# Patient Record
Sex: MALE | Race: WHITE | NOT HISPANIC OR LATINO | URBAN - METROPOLITAN AREA
[De-identification: names, ages, dates, MRNs, and addresses within clinical notes are randomized per-mention and may not be internally consistent; named-entity substitution may affect disease eponyms.]

---

## 2019-10-24 ENCOUNTER — INPATIENT (INPATIENT)
Facility: HOSPITAL | Age: 84
LOS: 4 days | Discharge: HOME | End: 2019-10-29
Attending: INTERNAL MEDICINE | Admitting: INTERNAL MEDICINE
Payer: MEDICARE

## 2019-10-24 VITALS
SYSTOLIC BLOOD PRESSURE: 149 MMHG | OXYGEN SATURATION: 97 % | TEMPERATURE: 98 F | DIASTOLIC BLOOD PRESSURE: 77 MMHG | RESPIRATION RATE: 16 BRPM | HEART RATE: 95 BPM

## 2019-10-24 DIAGNOSIS — L03.116 CELLULITIS OF LEFT LOWER LIMB: ICD-10-CM

## 2019-10-24 DIAGNOSIS — Z98.890 OTHER SPECIFIED POSTPROCEDURAL STATES: Chronic | ICD-10-CM

## 2019-10-24 DIAGNOSIS — R55 SYNCOPE AND COLLAPSE: ICD-10-CM

## 2019-10-24 DIAGNOSIS — R33.9 RETENTION OF URINE, UNSPECIFIED: ICD-10-CM

## 2019-10-24 DIAGNOSIS — Z29.9 ENCOUNTER FOR PROPHYLACTIC MEASURES, UNSPECIFIED: ICD-10-CM

## 2019-10-24 DIAGNOSIS — Z90.49 ACQUIRED ABSENCE OF OTHER SPECIFIED PARTS OF DIGESTIVE TRACT: Chronic | ICD-10-CM

## 2019-10-24 DIAGNOSIS — E78.5 HYPERLIPIDEMIA, UNSPECIFIED: ICD-10-CM

## 2019-10-24 DIAGNOSIS — N40.1 BENIGN PROSTATIC HYPERPLASIA WITH LOWER URINARY TRACT SYMPTOMS: ICD-10-CM

## 2019-10-24 DIAGNOSIS — E86.1 HYPOVOLEMIA: ICD-10-CM

## 2019-10-24 LAB
ALBUMIN SERPL ELPH-MCNC: 4.1 G/DL — SIGNIFICANT CHANGE UP (ref 3.3–5)
ALBUMIN SERPL ELPH-MCNC: 4.4 G/DL — SIGNIFICANT CHANGE UP (ref 3.3–5)
ALP SERPL-CCNC: 54 U/L — SIGNIFICANT CHANGE UP (ref 40–120)
ALP SERPL-CCNC: 67 U/L — SIGNIFICANT CHANGE UP (ref 40–120)
ALT FLD-CCNC: 10 U/L — SIGNIFICANT CHANGE UP (ref 4–41)
ALT FLD-CCNC: SIGNIFICANT CHANGE UP U/L (ref 4–41)
ANION GAP SERPL CALC-SCNC: 12 MMO/L — SIGNIFICANT CHANGE UP (ref 7–14)
ANION GAP SERPL CALC-SCNC: 14 MMO/L — SIGNIFICANT CHANGE UP (ref 7–14)
APPEARANCE UR: CLEAR — SIGNIFICANT CHANGE UP
APTT BLD: 20.4 SEC — LOW (ref 27.5–36.3)
AST SERPL-CCNC: 23 U/L — SIGNIFICANT CHANGE UP (ref 4–40)
AST SERPL-CCNC: 61 U/L — HIGH (ref 4–40)
BACTERIA # UR AUTO: NEGATIVE — SIGNIFICANT CHANGE UP
BASE EXCESS BLDV CALC-SCNC: 3.9 MMOL/L — SIGNIFICANT CHANGE UP
BASOPHILS # BLD AUTO: 0.03 K/UL — SIGNIFICANT CHANGE UP (ref 0–0.2)
BASOPHILS NFR BLD AUTO: 0.3 % — SIGNIFICANT CHANGE UP (ref 0–2)
BILIRUB SERPL-MCNC: 0.4 MG/DL — SIGNIFICANT CHANGE UP (ref 0.2–1.2)
BILIRUB SERPL-MCNC: 0.5 MG/DL — SIGNIFICANT CHANGE UP (ref 0.2–1.2)
BILIRUB UR-MCNC: NEGATIVE — SIGNIFICANT CHANGE UP
BLOOD GAS VENOUS - CREATININE: 0.7 MG/DL — SIGNIFICANT CHANGE UP (ref 0.5–1.3)
BLOOD GAS VENOUS - FIO2: 21 — SIGNIFICANT CHANGE UP
BLOOD UR QL VISUAL: NEGATIVE — SIGNIFICANT CHANGE UP
BUN SERPL-MCNC: 14 MG/DL — SIGNIFICANT CHANGE UP (ref 7–23)
BUN SERPL-MCNC: 15 MG/DL — SIGNIFICANT CHANGE UP (ref 7–23)
CALCIUM SERPL-MCNC: 10.1 MG/DL — SIGNIFICANT CHANGE UP (ref 8.4–10.5)
CALCIUM SERPL-MCNC: 9.7 MG/DL — SIGNIFICANT CHANGE UP (ref 8.4–10.5)
CHLORIDE BLDV-SCNC: 104 MMOL/L — SIGNIFICANT CHANGE UP (ref 96–108)
CHLORIDE SERPL-SCNC: 101 MMOL/L — SIGNIFICANT CHANGE UP (ref 98–107)
CHLORIDE SERPL-SCNC: 102 MMOL/L — SIGNIFICANT CHANGE UP (ref 98–107)
CK MB BLD-MCNC: 2.5 — SIGNIFICANT CHANGE UP (ref 0–2.5)
CK MB BLD-MCNC: 7.94 NG/ML — HIGH (ref 1–6.6)
CK SERPL-CCNC: 197 U/L — SIGNIFICANT CHANGE UP (ref 30–200)
CK SERPL-CCNC: 314 U/L — HIGH (ref 30–200)
CK SERPL-CCNC: SIGNIFICANT CHANGE UP U/L (ref 30–200)
CO2 SERPL-SCNC: 24 MMOL/L — SIGNIFICANT CHANGE UP (ref 22–31)
CO2 SERPL-SCNC: 26 MMOL/L — SIGNIFICANT CHANGE UP (ref 22–31)
COLOR SPEC: SIGNIFICANT CHANGE UP
CREAT SERPL-MCNC: 0.62 MG/DL — SIGNIFICANT CHANGE UP (ref 0.5–1.3)
CREAT SERPL-MCNC: 0.66 MG/DL — SIGNIFICANT CHANGE UP (ref 0.5–1.3)
EOSINOPHIL # BLD AUTO: 0.03 K/UL — SIGNIFICANT CHANGE UP (ref 0–0.5)
EOSINOPHIL NFR BLD AUTO: 0.3 % — SIGNIFICANT CHANGE UP (ref 0–6)
GAS PNL BLDV: 137 MMOL/L — SIGNIFICANT CHANGE UP (ref 136–146)
GLUCOSE BLDV-MCNC: 165 MG/DL — HIGH (ref 70–99)
GLUCOSE SERPL-MCNC: 119 MG/DL — HIGH (ref 70–99)
GLUCOSE SERPL-MCNC: 167 MG/DL — HIGH (ref 70–99)
GLUCOSE UR-MCNC: NEGATIVE — SIGNIFICANT CHANGE UP
HBA1C BLD-MCNC: 5.8 % — HIGH (ref 4–5.6)
HCO3 BLDV-SCNC: 26 MMOL/L — SIGNIFICANT CHANGE UP (ref 20–27)
HCT VFR BLD CALC: 43.7 % — SIGNIFICANT CHANGE UP (ref 39–50)
HCT VFR BLDV CALC: 43.9 % — SIGNIFICANT CHANGE UP (ref 39–51)
HGB BLD-MCNC: 13.7 G/DL — SIGNIFICANT CHANGE UP (ref 13–17)
HGB BLDV-MCNC: 14.3 G/DL — SIGNIFICANT CHANGE UP (ref 13–17)
HYALINE CASTS # UR AUTO: NEGATIVE — SIGNIFICANT CHANGE UP
IMM GRANULOCYTES NFR BLD AUTO: 0.5 % — SIGNIFICANT CHANGE UP (ref 0–1.5)
INR BLD: 0.97 — SIGNIFICANT CHANGE UP (ref 0.88–1.17)
KETONES UR-MCNC: NEGATIVE — SIGNIFICANT CHANGE UP
LACTATE BLDV-MCNC: 2.2 MMOL/L — HIGH (ref 0.5–2)
LEUKOCYTE ESTERASE UR-ACNC: NEGATIVE — SIGNIFICANT CHANGE UP
LYMPHOCYTES # BLD AUTO: 1.05 K/UL — SIGNIFICANT CHANGE UP (ref 1–3.3)
LYMPHOCYTES # BLD AUTO: 10.8 % — LOW (ref 13–44)
MCHC RBC-ENTMCNC: 31.4 % — LOW (ref 32–36)
MCHC RBC-ENTMCNC: 31.5 PG — SIGNIFICANT CHANGE UP (ref 27–34)
MCV RBC AUTO: 100.5 FL — HIGH (ref 80–100)
MONOCYTES # BLD AUTO: 0.72 K/UL — SIGNIFICANT CHANGE UP (ref 0–0.9)
MONOCYTES NFR BLD AUTO: 7.4 % — SIGNIFICANT CHANGE UP (ref 2–14)
NEUTROPHILS # BLD AUTO: 7.82 K/UL — HIGH (ref 1.8–7.4)
NEUTROPHILS NFR BLD AUTO: 80.7 % — HIGH (ref 43–77)
NITRITE UR-MCNC: NEGATIVE — SIGNIFICANT CHANGE UP
NRBC # FLD: 0 K/UL — SIGNIFICANT CHANGE UP (ref 0–0)
PCO2 BLDV: 54 MMHG — HIGH (ref 41–51)
PH BLDV: 7.35 PH — SIGNIFICANT CHANGE UP (ref 7.32–7.43)
PH UR: 7.5 — SIGNIFICANT CHANGE UP (ref 5–8)
PLATELET # BLD AUTO: 122 K/UL — LOW (ref 150–400)
PMV BLD: 13.5 FL — HIGH (ref 7–13)
PO2 BLDV: 27 MMHG — LOW (ref 35–40)
POTASSIUM BLDV-SCNC: 3.7 MMOL/L — SIGNIFICANT CHANGE UP (ref 3.4–4.5)
POTASSIUM SERPL-MCNC: 4.2 MMOL/L — SIGNIFICANT CHANGE UP (ref 3.5–5.3)
POTASSIUM SERPL-MCNC: SIGNIFICANT CHANGE UP MMOL/L (ref 3.5–5.3)
POTASSIUM SERPL-SCNC: 4.2 MMOL/L — SIGNIFICANT CHANGE UP (ref 3.5–5.3)
POTASSIUM SERPL-SCNC: SIGNIFICANT CHANGE UP MMOL/L (ref 3.5–5.3)
PROT SERPL-MCNC: 7.8 G/DL — SIGNIFICANT CHANGE UP (ref 6–8.3)
PROT SERPL-MCNC: SIGNIFICANT CHANGE UP G/DL (ref 6–8.3)
PROT UR-MCNC: 20 — SIGNIFICANT CHANGE UP
PROTHROM AB SERPL-ACNC: 10.7 SEC — SIGNIFICANT CHANGE UP (ref 9.8–13.1)
RBC # BLD: 4.35 M/UL — SIGNIFICANT CHANGE UP (ref 4.2–5.8)
RBC # FLD: 14.3 % — SIGNIFICANT CHANGE UP (ref 10.3–14.5)
RBC CASTS # UR COMP ASSIST: SIGNIFICANT CHANGE UP (ref 0–?)
SAO2 % BLDV: 42 % — LOW (ref 60–85)
SODIUM SERPL-SCNC: 137 MMOL/L — SIGNIFICANT CHANGE UP (ref 135–145)
SODIUM SERPL-SCNC: 142 MMOL/L — SIGNIFICANT CHANGE UP (ref 135–145)
SP GR SPEC: 1.01 — SIGNIFICANT CHANGE UP (ref 1–1.04)
SQUAMOUS # UR AUTO: SIGNIFICANT CHANGE UP
TROPONIN T, HIGH SENSITIVITY: 17 NG/L — SIGNIFICANT CHANGE UP (ref ?–14)
TROPONIN T, HIGH SENSITIVITY: 30 NG/L — SIGNIFICANT CHANGE UP (ref ?–14)
TROPONIN T, HIGH SENSITIVITY: 31 NG/L — SIGNIFICANT CHANGE UP (ref ?–14)
TSH SERPL-MCNC: 0.91 UIU/ML — SIGNIFICANT CHANGE UP (ref 0.27–4.2)
UROBILINOGEN FLD QL: NORMAL — SIGNIFICANT CHANGE UP
WBC # BLD: 9.7 K/UL — SIGNIFICANT CHANGE UP (ref 3.8–10.5)
WBC # FLD AUTO: 9.7 K/UL — SIGNIFICANT CHANGE UP (ref 3.8–10.5)
WBC UR QL: SIGNIFICANT CHANGE UP (ref 0–?)

## 2019-10-24 PROCEDURE — 71045 X-RAY EXAM CHEST 1 VIEW: CPT | Mod: 26

## 2019-10-24 PROCEDURE — 72125 CT NECK SPINE W/O DYE: CPT | Mod: 26

## 2019-10-24 PROCEDURE — 70450 CT HEAD/BRAIN W/O DYE: CPT | Mod: 26

## 2019-10-24 PROCEDURE — 72170 X-RAY EXAM OF PELVIS: CPT | Mod: 26

## 2019-10-24 PROCEDURE — 99222 1ST HOSP IP/OBS MODERATE 55: CPT

## 2019-10-24 RX ORDER — INFLUENZA VIRUS VACCINE 15; 15; 15; 15 UG/.5ML; UG/.5ML; UG/.5ML; UG/.5ML
0.5 SUSPENSION INTRAMUSCULAR ONCE
Refills: 0 | Status: DISCONTINUED | OUTPATIENT
Start: 2019-10-24 | End: 2019-10-29

## 2019-10-24 RX ORDER — CEFAZOLIN SODIUM 1 G
1000 VIAL (EA) INJECTION EVERY 8 HOURS
Refills: 0 | Status: DISCONTINUED | OUTPATIENT
Start: 2019-10-24 | End: 2019-10-29

## 2019-10-24 RX ORDER — ASPIRIN/CALCIUM CARB/MAGNESIUM 324 MG
162 TABLET ORAL ONCE
Refills: 0 | Status: COMPLETED | OUTPATIENT
Start: 2019-10-24 | End: 2019-10-24

## 2019-10-24 RX ORDER — SODIUM CHLORIDE 9 MG/ML
1000 INJECTION, SOLUTION INTRAVENOUS ONCE
Refills: 0 | Status: COMPLETED | OUTPATIENT
Start: 2019-10-24 | End: 2019-10-24

## 2019-10-24 RX ORDER — SODIUM CHLORIDE 9 MG/ML
1000 INJECTION INTRAMUSCULAR; INTRAVENOUS; SUBCUTANEOUS
Refills: 0 | Status: DISCONTINUED | OUTPATIENT
Start: 2019-10-24 | End: 2019-10-29

## 2019-10-24 RX ORDER — HEPARIN SODIUM 5000 [USP'U]/ML
5000 INJECTION INTRAVENOUS; SUBCUTANEOUS EVERY 8 HOURS
Refills: 0 | Status: DISCONTINUED | OUTPATIENT
Start: 2019-10-24 | End: 2019-10-29

## 2019-10-24 RX ORDER — ASPIRIN/CALCIUM CARB/MAGNESIUM 324 MG
81 TABLET ORAL DAILY
Refills: 0 | Status: DISCONTINUED | OUTPATIENT
Start: 2019-10-25 | End: 2019-10-29

## 2019-10-24 RX ORDER — TAMSULOSIN HYDROCHLORIDE 0.4 MG/1
0.4 CAPSULE ORAL AT BEDTIME
Refills: 0 | Status: DISCONTINUED | OUTPATIENT
Start: 2019-10-24 | End: 2019-10-29

## 2019-10-24 RX ADMIN — SODIUM CHLORIDE 75 MILLILITER(S): 9 INJECTION INTRAMUSCULAR; INTRAVENOUS; SUBCUTANEOUS at 21:17

## 2019-10-24 RX ADMIN — Medication 162 MILLIGRAM(S): at 13:16

## 2019-10-24 RX ADMIN — HEPARIN SODIUM 5000 UNIT(S): 5000 INJECTION INTRAVENOUS; SUBCUTANEOUS at 21:17

## 2019-10-24 RX ADMIN — SODIUM CHLORIDE 1000 MILLILITER(S): 9 INJECTION, SOLUTION INTRAVENOUS at 10:59

## 2019-10-24 RX ADMIN — Medication 100 MILLIGRAM(S): at 17:15

## 2019-10-24 RX ADMIN — TAMSULOSIN HYDROCHLORIDE 0.4 MILLIGRAM(S): 0.4 CAPSULE ORAL at 17:16

## 2019-10-24 RX ADMIN — SODIUM CHLORIDE 75 MILLILITER(S): 9 INJECTION INTRAMUSCULAR; INTRAVENOUS; SUBCUTANEOUS at 17:15

## 2019-10-24 NOTE — H&P ADULT - NEGATIVE CARDIOVASCULAR SYMPTOMS
no chest pain/no orthopnea/no dyspnea on exertion/no peripheral edema/no claudication/no palpitations/no paroxysmal nocturnal dyspnea

## 2019-10-24 NOTE — ED ADULT TRIAGE NOTE - MEANS OF ARRIVAL
wears compression stockings, recent duplex due to peripheral edema, reports negative for blood clots, recommended compression stockings ambulatory

## 2019-10-24 NOTE — ED PROVIDER NOTE - PROGRESS NOTE DETAILS
IOANA Rosen - Lactate 2.2. Will give LR bolus. Silas, PGY1 - CMP hemolyzed, Trop 17. No current CP. Will get 1hr repeat Trop and repeat CMP. XR pelvis, CXR negative. Pending UA, CT. Silas, PGY1 - Spoke to daughter Nicol Glasgow [phone]. She states that pt has h/o MI ~25 years ago, no stents, pt is supposed to be on heart meds. Updated her on current pt results and plan. Silas, PGY1 - Trop 17 -> 31, , pt given ASA. Repeat EKG similar to prior.

## 2019-10-24 NOTE — H&P ADULT - NSICDXPASTSURGICALHX_GEN_ALL_CORE_FT
PAST SURGICAL HISTORY:  H/O eye surgery within last year    H/O hernia repair 2x; 1990, 2009    History of atherectomy age 68 y/o post MI 25 years go    S/P laparoscopic appendectomy 1947 PAST SURGICAL HISTORY:  H/O eye surgery within last year    H/O hernia repair 2x; 1990, 2009    History of atherectomy age 66 y/o post MI 25 years go    S/P laparoscopic appendectomy 1947

## 2019-10-24 NOTE — H&P ADULT - NSHPSOCIALHISTORY_GEN_ALL_CORE
Pt is Muslim, , [place] [facility] [place] [facility]. Pt has a twin sister still living, and a brother, who passed away at age 58 from CAD. 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 Pt does not drink alcohol anymore, last drink in 1975. Pt never smoked cigarettes and denies illicit drug use at this time. Pt has good appetite, but sometimes does not eat breakfast. Pt Pt is Episcopalian, , currently lives alone in [place] apartment co-op. Pt's wife passed away 9 year ago from a 2nd MI. Pt has a twin sister, still living, in [place], and a brother, who passed away at age 58 from [employer]. Pt has two children, 2 grandchildren and 2 great grandchildren. 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 Pt does not drink alcohol anymore, last drink in 1975. Pt never smoked cigarettes and denies illicit drug use at this time. Pt has good appetite, but sometimes does not eat breakfast. Pt follows a low cholesterol diet. Pt used to be in the  and served from [id number] and [id number]. Pt has not had his flu or PNA vaccination, but would do so here. Pt does not used supplemental O2 or CPAP at home.

## 2019-10-24 NOTE — CONSULT NOTE ADULT - ASSESSMENT
89 y/o  male with PMHx of cataracts, HLD, past MI 24 y/o s/p atherectomy, CAD, varicose veins, h/o hernia x2, h/o fractured ankle 3 mo ago, and h/o BPH, p/w  syncopal episode, admitted to tele for syncope, r/o ACS, hypovolemia dehydration and acute cellulitis LLE. 89 y/o  male with PMHx of cataracts, HLD, past MI 24 y/o s/p atherectomy, CAD, varicose veins, h/o hernia x2, h/o fractured ankle 3 mo ago, and h/o BPH, p/w  syncopal episode, admitted to tele for syncope, r/o ACS, hypovolemia dehydration and acute cellulitis LLE.          Problem/Plan - 1:  ·  Problem: Syncope and collapse.  Plan: Telemetry monitor  Serial EKG's and CE's x3  Check Orthostatics  ECHO pending   COnt ASA   PT evaluation  SW  EP eval appreciated.      Problem/Plan - 2:  ·  Problem: Hypovolemia dehydration.  Plan: improved after hydration  doing well  labs noted, monitor electrolytes.      Problem/Plan - 3:  ·  Problem: Urinary retention with incomplete bladder emptying.  Plan: Started pt on Flomax 0.4mg po daily  Repeat PVR Q8h x3.      Problem/Plan - 4:  ·  Problem: Cellulitis of left lower extremity without foot.  Plan: IV Cefazolin 1gram TID  ID eval called for further recs.      Problem/Plan - 5:  ·  Problem: BPH with obstruction/lower urinary tract symptoms.  Plan: Started Flomax 0.4mg  F/u Urine Cx.      Problem/Plan - 6:  Problem: Hyperlipidemia, unspecified hyperlipidemia type. Plan: lipid panel.     Problem/Plan - 7:  ·  Problem: Prophylactic measure.  Plan: Heparin 5000 units SC Q8h  PT.

## 2019-10-24 NOTE — ED PROVIDER NOTE - ATTENDING CONTRIBUTION TO CARE
I performed a face to face bedside interview with patient regarding history of present illness, review of symptoms and past medical history. I completed an independent physical exam.  I have discussed patient's plan of care.   I agree with note as stated above, having amended the EMR as needed to reflect my findings. I have discussed the assessment and plan of care.  This includes during the time I functioned as the attending physician for this patient.  Attending Contribution to Care: agree with plan of resident. 89M PMH HLD, MI (25 years ago, s/p atherectomy) not on ASA BIBEMS, as per triage note, was found laying supine on train. Was confused and combative on scene. Pt currently A&Ox3, but is confused about this AM's events. Pt states he was on his way to [facility] for a PMD visit. He recalls getting on the train and then woke up in the ambulance. Denies any preceding or current sx of lightheadedness, CP, SOB. Denies previous episodes of syncope. No f/c, n/v, abdominal pain, dysuria, recent travel. +Bladder fullness. Reports chronic issue of poor urinary stream 2/2 "prostate issue." Believes he had a cardiac issue previously, but does not take any medications. Lives at home alone.. pt will be admitted for unknown possible syncopal event, unknown last seen normal. requires cardiac monitoring.

## 2019-10-24 NOTE — ED PROVIDER NOTE - PHYSICAL EXAMINATION
I have reviewed the triage vital signs.  Const: AAOx4, laying comfortably in NAD  Eyes: PERRL, no conjunctival injection  HENT: NCAT, Neck supple without meningismus, oral mucosa moist  CV: RRR, no obvious murmurs, rubs, or gallops appreciated  Resp: CTAB, no respiratory distress, no wheezes, rales, or rhonchi  GI: Abdomen soft, NTND, no guarding, no CVA tenderness  Extremities: No peripheral edema,  2+ radial and DP pulses  Skin: Warm, well perfused, no rash  MSK: No gross deformities appreciated, no midline tenderness  Neuro: No focal sensory or motor deficits, CN 2-12 grossly intact  Psych: Appropriate mood and affect I have reviewed the triage vital signs.  Const: AAOx3, laying comfortably in NAD  Eyes: PERRL, no conjunctival injection  HENT: NCAT, Neck supple without meningismus, oral mucosa moist, no tongue lacerations  CV: RRR, no obvious murmurs, rubs, or gallops appreciated  Resp: CTAB, no respiratory distress, no wheezes, rales, or rhonchi  GI: Abdomen soft, NTND, no guarding, no CVA tenderness  Extremities: No peripheral edema,  2+ radial and DP pulses  Skin: Warm, well perfused, no rash  MSK: No gross deformities appreciated, no midline tenderness  Neuro: CN2-12 grossly intact, MS +5/5 in UE and LE BL, finger to nose smooth and rapid, gross sensation intact in UE and LE BL  Psych: Appropriate mood and affect

## 2019-10-24 NOTE — ED PROVIDER NOTE - CARE PLAN
Principal Discharge DX:	Syncope  Secondary Diagnosis:	EKG abnormality  Secondary Diagnosis:	Troponin level elevated

## 2019-10-24 NOTE — ED PROVIDER NOTE - OBJECTIVE STATEMENT
89M PMH HLD BIBEMS, as per triage note, was found laying supine on train. Was confused and combative on scene. Pt currently A&Ox4, but is confused about this AM's events. Pt states he was on his way to [facility] for a PMD visit. He recalls getting on the train and then woke up in the ambulance. Denies any preceding or current sx of lightheadedness, CP, SOB. Denies previous episodes of syncope. No f/c, n/v, abdominal pain, dysuria, recent travel. +Bladder fullness. Reports chronic issue of poor urinary stream 2/2 "prostate issue." Believes he had a cardiac issue previously, but does not take any medications. 89M PMH HLD BIBEMS, as per triage note, was found laying supine on train. Was confused and combative on scene. Pt currently A&Ox4, but is confused about this AM's events. Pt states he was on his way to [facility] for a PMD visit. He recalls getting on the train and then woke up in the ambulance. Denies any preceding or current sx of lightheadedness, CP, SOB. Denies previous episodes of syncope. No f/c, n/v, abdominal pain, dysuria, recent travel. +Bladder fullness. Reports chronic issue of poor urinary stream 2/2 "prostate issue." Believes he had a cardiac issue previously, but does not take any medications. Lives at home alone. 89M unknown full PMH but pt admits to D BIBEMS, as per triage note, was found laying supine on train. Was confused and combative on scene. Pt currently A&Ox3, but is confused about this AM's events. Pt states he was on his way to [facility] for a PMD visit. He recalls getting on the train and then woke up in the ambulance. Denies any preceding or current sx of lightheadedness, CP, SOB. Denies previous episodes of syncope. No f/c, n/v, abdominal pain, dysuria, recent travel. +Bladder fullness. Reports chronic issue of poor urinary stream 2/2 "prostate issue." Believes he had a cardiac issue previously, but does not take any medications. Lives at home alone. 89M PMH HLD, MI (25 years ago, s/p atherectomy) not on ASA BIBEMS, as per triage note, was found laying supine on train. Was confused and combative on scene. Pt currently A&Ox3, but is confused about this AM's events. Pt states he was on his way to [facility] for a PMD visit. He recalls getting on the train and then woke up in the ambulance. Denies any preceding or current sx of lightheadedness, CP, SOB. Denies previous episodes of syncope. No f/c, n/v, abdominal pain, dysuria, recent travel. +Bladder fullness. Reports chronic issue of poor urinary stream 2/2 "prostate issue." Believes he had a cardiac issue previously, but does not take any medications. Lives at home alone.

## 2019-10-24 NOTE — CONSULT NOTE ADULT - SUBJECTIVE AND OBJECTIVE BOX
89 y/o  male with PMHx of cataracts, HLD, past MI 24 y/o s/p atherectomy, CAD, varicose veins, h/o hernia x2, h/o fractured ankle 3 mo ago, and h/o BPH, p/w possible syncopal episode. Pt was found lying supine on the train, confused, agitated and (+) urinary incontinence on scene. Pt reports he woke up this morning, went to the bathroom, then went to the train station to go to his dentist appt at 9am on [facility] for an implantation consultation. Pt reveals he remembers getting onto the train and waking up in the ambulance en route to hospital, but cannot recall what happened in between. Pt is currently A&Ox3. Pt c/o bladder fullness, and poor urinary stream x4-5mo. Pt states he is unable to fully empty his bladder, and feels he is always going to the bathroom. Pt states he had a colonoscopy 5 years ago at [facility], which was normal. Pt is unaware when his last prostate exam was. Pt endorses feeling fine this morning. Pt denies lightheadedness, dizziness, HA, CP, SOB, and h/o syncope or seizures this morning. Pt states he has not been drinking any water since arrival at the ED, as he does not want to urinate frequently. Pt also c/o LE swelling about 3-4 mo ago, which spontaneously resolved, and chronic insomnia. Denies abdominal pain, N/V/C/D, vision changes, dysuria, hematuria, night sweats, weight changes, joint stiffness, and muscle weakness at this time. Denies recent travel, sick contacts and prolonged bed rest at this time.    On admission pt's PVR with a bladderscan is 200cc.    PAST MEDICAL & SURGICAL HISTORY:  History of BPH  History of ankle fracture: 3 y/o  H/O varicose veins  H/O hernia repair: x2;  and   Cataracts, bilateral: eye surgery last year B/L  Coronary artery disease  History of myocardial infarction: MI 24 y/o s/p atherectomy at age 68 y/o; no stents placed  Hyperlipidemia  H/O eye surgery: within last year  H/O hernia repair: 2x; ,   S/P laparoscopic appendectomy: 1947  History of atherectomy: age 68 y/o post MI 25 years go      MEDICATIONS  (STANDING):  ceFAZolin   IVPB 1000 milliGRAM(s) IV Intermittent every 8 hours  heparin  Injectable 5000 Unit(s) SubCutaneous every 8 hours  influenza   Vaccine 0.5 milliLiter(s) IntraMuscular once  sodium chloride 0.9%. 1000 milliLiter(s) (75 mL/Hr) IV Continuous <Continuous>  tamsulosin 0.4 milliGRAM(s) Oral at bedtime    Allergies    No Known Allergies    Vital Signs Last 24 Hrs  T(C): 36.4 (24 Oct 2019 19:45), Max: 36.7 (24 Oct 2019 11:57)  T(F): 97.5 (24 Oct 2019 19:45), Max: 98 (24 Oct 2019 11:57)  HR: 76 (24 Oct 2019 19:45) (76 - 95)  BP: 126/72 (24 Oct 2019 19:45) (126/72 - 149/77)  BP(mean): --  RR: 17 (24 Oct 2019 19:45) (16 - 18)  SpO2: 99% (24 Oct 2019 19:45) (97% - 99%)                          13.7   9.70  )-----------( 122      ( 24 Oct 2019 10:15 )             43.7               10-24    142  |  102  |  14  ----------------------------<  119<H>  4.2   |  26  |  0.66    Ca    10.1      24 Oct 2019 12:15    TPro  7.8  /  Alb  4.4  /  TBili  0.4  /  DBili  x   /  AST  23  /  ALT  10  /  AlkPhos  67  10-24    PT/INR - ( 24 Oct 2019 10:15 )   PT: 10.7 SEC;   INR: 0.97          PTT - ( 24 Oct 2019 10:15 )  PTT:20.4 SEC       CARDIAC MARKERS ( 24 Oct 2019 15:48 )  x     / x     / 314 u/L / 7.94 ng/mL / x      CARDIAC MARKERS ( 24 Oct 2019 12:15 )  x     / x     / 197 u/L / x     / x      CARDIAC MARKERS ( 24 Oct 2019 10:15 )  x     / x     / Test not performed SPECIMEN GROSSLY HEMOLYZED u/L / x     / x                  Urinalysis Basic - ( 24 Oct 2019 12:12 )    Color: LIGHT YELLOW / Appearance: CLEAR / S.013 / pH: 7.5  Gluc: NEGATIVE / Ketone: NEGATIVE  / Bili: NEGATIVE / Urobili: NORMAL   Blood: NEGATIVE / Protein: 20 / Nitrite: NEGATIVE   Leuk Esterase: NEGATIVE / RBC: 0-2 / WBC 0-2   Sq Epi: OCC / Non Sq Epi: x / Bacteria: NEGATIVE    < from: Xray Chest 1 View- PORTABLE-Urgent (10.24.19 @ 10:51) >  COMPARISON:  None available      IMPRESSION:  Clear lungs with large hiatal hernia. 89 y/o  male with PMHx of cataracts, HLD, past MI 26 y/o s/p atherectomy, CAD, varicose veins, h/o hernia x2, h/o fractured ankle 3 mo ago, and h/o BPH, p/w possible syncopal episode. Pt was found lying supine on the train, confused, agitated and (+) urinary incontinence on scene. Pt reports he woke up this morning, went to the bathroom, then went to the train station to go to his dentist appt at 9am on [facility] for an implantation consultation. Pt reveals he remembers getting onto the train and waking up in the ambulance en route to hospital, but cannot recall what happened in between. Pt is currently A&Ox3. Pt c/o bladder fullness, and poor urinary stream x4-5mo. Pt states he is unable to fully empty his bladder, and feels he is always going to the bathroom. Pt states he had a colonoscopy 5 years ago at [facility], which was normal. Pt is unaware when his last prostate exam was. Pt endorses feeling fine this morning. Pt denies lightheadedness, dizziness, HA, CP, SOB, and h/o syncope or seizures this morning. Pt states he has not been drinking any water since arrival at the ED, as he does not want to urinate frequently. Pt also c/o LE swelling about 3-4 mo ago, which spontaneously resolved, and chronic insomnia. Denies abdominal pain, N/V/C/D, vision changes, dysuria, hematuria, night sweats, weight changes, joint stiffness, and muscle weakness at this time. Denies recent travel, sick contacts and prolonged bed rest at this time.    On admission pt's PVR with a bladderscan is 200cc.    PAST MEDICAL & SURGICAL HISTORY:  History of BPH  History of ankle fracture: 3 y/o  H/O varicose veins  H/O hernia repair: x2;  and   Cataracts, bilateral: eye surgery last year B/L  Coronary artery disease  History of myocardial infarction: MI 26 y/o s/p atherectomy at age 66 y/o; no stents placed  Hyperlipidemia  H/O eye surgery: within last year  H/O hernia repair: 2x; ,   S/P laparoscopic appendectomy: 1947  History of atherectomy: age 66 y/o post MI 25 years go      MEDICATIONS  (STANDING):  ceFAZolin   IVPB 1000 milliGRAM(s) IV Intermittent every 8 hours  heparin  Injectable 5000 Unit(s) SubCutaneous every 8 hours  influenza   Vaccine 0.5 milliLiter(s) IntraMuscular once  sodium chloride 0.9%. 1000 milliLiter(s) (75 mL/Hr) IV Continuous <Continuous>  tamsulosin 0.4 milliGRAM(s) Oral at bedtime    Allergies    No Known Allergies    Vital Signs Last 24 Hrs  T(C): 36.4 (24 Oct 2019 19:45), Max: 36.7 (24 Oct 2019 11:57)  T(F): 97.5 (24 Oct 2019 19:45), Max: 98 (24 Oct 2019 11:57)  HR: 76 (24 Oct 2019 19:45) (76 - 95)  BP: 126/72 (24 Oct 2019 19:45) (126/72 - 149/77)  BP(mean): --  RR: 17 (24 Oct 2019 19:45) (16 - 18)  SpO2: 99% (24 Oct 2019 19:45) (97% - 99%)    Appearance: Normal	  HEENT:   Normal oral mucosa, PERRL, EOMI	  Lymphatic: No lymphadenopathy , no edema  Cardiovascular: Normal S1 S2  Respiratory: Lungs clear to auscultation, normal effort 	  Gastrointestinal:  Soft, Non-tender, + BS	  Skin: erythema LE   Musculoskeletal: Normal range of motion, normal strength  Psychiatry:  Mood & affect appropriate  Ext: No edema                          13.7   9.70  )-----------( 122      ( 24 Oct 2019 10:15 )             43.7               10-24    142  |  102  |  14  ----------------------------<  119<H>  4.2   |  26  |  0.66    Ca    10.1      24 Oct 2019 12:15    TPro  7.8  /  Alb  4.4  /  TBili  0.4  /  DBili  x   /  AST  23  /  ALT  10  /  AlkPhos  67  10-24    PT/INR - ( 24 Oct 2019 10:15 )   PT: 10.7 SEC;   INR: 0.97          PTT - ( 24 Oct 2019 10:15 )  PTT:20.4 SEC       CARDIAC MARKERS ( 24 Oct 2019 15:48 )  x     / x     / 314 u/L / 7.94 ng/mL / x      CARDIAC MARKERS ( 24 Oct 2019 12:15 )  x     / x     / 197 u/L / x     / x      CARDIAC MARKERS ( 24 Oct 2019 10:15 )  x     / x     / Test not performed SPECIMEN GROSSLY HEMOLYZED u/L / x     / x                  Urinalysis Basic - ( 24 Oct 2019 12:12 )    Color: LIGHT YELLOW / Appearance: CLEAR / S.013 / pH: 7.5  Gluc: NEGATIVE / Ketone: NEGATIVE  / Bili: NEGATIVE / Urobili: NORMAL   Blood: NEGATIVE / Protein: 20 / Nitrite: NEGATIVE   Leuk Esterase: NEGATIVE / RBC: 0-2 / WBC 0-2   Sq Epi: OCC / Non Sq Epi: x / Bacteria: NEGATIVE    < from: Xray Chest 1 View- PORTABLE-Urgent (10.24.19 @ 10:51) >  COMPARISON:  None available      IMPRESSION:  Clear lungs with large hiatal hernia.

## 2019-10-24 NOTE — H&P ADULT - NEGATIVE GENERAL SYMPTOMS
no fever/no sweating/no weight loss/no chills/no fatigue/no anorexia/no malaise/no weight gain/no polyphagia/no polyuria/no polydipsia

## 2019-10-24 NOTE — H&P ADULT - NSHPLABSRESULTS_GEN_ALL_CORE
13.7   9.70  )-----------( 122      ( 24 Oct 2019 10:15 )             43.7   10-24    142  |  102  |  14  ----------------------------<  119<H>  4.2   |  26  |  0.66    Ca    10.1      24 Oct 2019 12:15    TPro  7.8  /  Alb  4.4  /  TBili  0.4  /  DBili  x   /  AST  23  /  ALT  10  /  AlkPhos  67  10-24    LIVER FUNCTIONS - ( 24 Oct 2019 12:15 )  Alb: 4.4 g/dL / Pro: 7.8 g/dL / ALK PHOS: 67 u/L / ALT: 10 u/L / AST: 23 u/L / GGT: x           CAPILLARY BLOOD GLUCOSE  POCT Blood Glucose.: 155 mg/dL (24 Oct 2019 09:15)    EKG: NSR @ 77 bpm with premature atrial complexes; peaked T waves in V2,V3    Troponin: 17 --> 31  CK: 197

## 2019-10-24 NOTE — H&P ADULT - GASTROINTESTINAL DETAILS
no guarding/bowel sounds normal/no organomegaly/normal/no bruit/no distention/soft/no masses palpable/nontender/no rebound tenderness/no rigidity

## 2019-10-24 NOTE — H&P ADULT - NEGATIVE NEUROLOGICAL SYMPTOMS
no transient paralysis/no vertigo/no headache/no confusion/no tremors/no hemiparesis/no facial palsy/no paresthesias/no weakness/no generalized seizures/no focal seizures/no loss of sensation/no difficulty walking

## 2019-10-24 NOTE — H&P ADULT - RS GEN PE MLT RESP DETAILS PC
respirations non-labored/clear to auscultation bilaterally/good air movement/breath sounds equal/no wheezes/no subcutaneous emphysema/no chest wall tenderness/no intercostal retractions/no rales/no rhonchi/airway patent/normal

## 2019-10-24 NOTE — H&P ADULT - NEGATIVE OPHTHALMOLOGIC SYMPTOMS
no photophobia/no discharge L/no pain R/no scleral injection L/pt wears glasses/no irritation L/no lacrimation R/no blurred vision L/no blurred vision R/no lacrimation L/no discharge R/no pain L/no loss of vision R/no scleral injection R/no diplopia/no irritation R/no loss of vision L

## 2019-10-24 NOTE — H&P ADULT - ASSESSMENT
89 y/o  male with PMHx of cataracts, HLD, past MI 24 y/o s/p atherectomy, CAD, varicose veins, h/o hernia x2, h/o fractured ankle 3 mo ago, and h/o BPH, p/w possible syncopal episode. 89 y/o  male with PMHx of cataracts, HLD, past MI 24 y/o s/p atherectomy, CAD, varicose veins, h/o hernia x2, h/o fractured ankle 3 mo ago, and h/o BPH, p/w  syncopal episode, admitted to tele for syncope, r/o ACS, hypovolemia dehydration and acute cellulitis LLE.

## 2019-10-24 NOTE — H&P ADULT - CONSTITUTIONAL DETAILS
well-nourished/well-groomed/no distress/well-developed well-developed/well-groomed/well-nourished/no distress/cachectic

## 2019-10-24 NOTE — H&P ADULT - ERYTHEMA LOCATION
leg L/cellulitis on left lower leg; erythema leg L/cellulitis on left lower leg; erythema. Poor skin turgor.

## 2019-10-24 NOTE — H&P ADULT - NSICDXPASTMEDICALHX_GEN_ALL_CORE_FT
PAST MEDICAL HISTORY:  Cataracts, bilateral eye surgery last year B/L    Coronary artery disease     H/O hernia repair x2; 1990 and 2009    H/O varicose veins     History of ankle fracture 3 y/o    History of myocardial infarction MI 26 y/o s/p atherectomy at age 66 y/o; no stents placed    Hyperlipidemia PAST MEDICAL HISTORY:  Cataracts, bilateral eye surgery last year B/L    Coronary artery disease     H/O hernia repair x2; 1990 and 2009    H/O varicose veins     History of ankle fracture 3 y/o    History of BPH     History of myocardial infarction MI 26 y/o s/p atherectomy at age 66 y/o; no stents placed    Hyperlipidemia

## 2019-10-24 NOTE — H&P ADULT - NEGATIVE MUSCULOSKELETAL SYMPTOMS
no arthralgia/no myalgia/no stiffness/no neck pain/no arm pain L/no arm pain R/no leg pain R/no muscle cramps/no leg pain L/no arthritis/no joint swelling/no muscle weakness/no back pain

## 2019-10-24 NOTE — H&P ADULT - NEGATIVE GENERAL GENITOURINARY SYMPTOMS
no dysuria/normal libido/no gas in urine/no nocturia/no renal colic/no flank pain R/no urine discoloration/no hematuria/no flank pain L/no bladder infections

## 2019-10-24 NOTE — H&P ADULT - HISTORY OF PRESENT ILLNESS
89 y/o  male with PMHx of cataracts, HLD, past MI 24 y/o s/p atherectomy, CAD, varicose veins, h/o hernia x2, h/o fractured ankle 3 mo ago, and h/o BPH, p/w possible syncopal episode. Pt was found lying supine on the train, confused, agitated and (+) urinary incontinence on scene. Pt reports he woke up this morning, went to the bathroom, then went to the train station to go to his dentist appt at 9am on [facility] for an implantation consultation. Pt reveals he remembers getting onto the train and waking up in the ambulance en route to hospital, but cannot recall what happened in between. Pt is currently A&Ox3. Pt c/o bladder fullness, and poor urinary stream x4-5mo. Pt states he is unable to fully empty his bladder, and feels he is always going to the bathroom. Pt states he had a colonoscopy 5 years ago at [facility], which was normal. Pt is unaware when his last prostate exam was. Pt endorses feeling fine this morning. Pt denies lightheadedness, dizziness, HA, CP, SOB, and h/o syncope or seizures this morning. Pt also c/o LE swelling about 3-4 mo ago, which spontaneously resolved, and chronic insomnia. Denies abdominal pain, N/V/C/D, vision changes, dysuria, hematuria, night sweats, weight changes, joint stiffness, and muscle weakness at this time. Denies recent travel, sick contacts and prolonged bed rest at this time. 89 y/o  male with PMHx of cataracts, HLD, past MI 24 y/o s/p atherectomy, CAD, varicose veins, h/o hernia x2, h/o fractured ankle 3 mo ago, and h/o BPH, p/w possible syncopal episode. Pt was found lying supine on the train, confused, agitated and (+) urinary incontinence on scene. Pt reports he woke up this morning, went to the bathroom, then went to the train station to go to his dentist appt at 9am on [facility] for an implantation consultation. Pt reveals he remembers getting onto the train and waking up in the ambulance en route to hospital, but cannot recall what happened in between. Pt is currently A&Ox3. Pt c/o bladder fullness, and poor urinary stream x4-5mo. Pt states he is unable to fully empty his bladder, and feels he is always going to the bathroom. Pt states he had a colonoscopy 5 years ago at [facility], which was normal. Pt is unaware when his last prostate exam was. Pt endorses feeling fine this morning. Pt denies lightheadedness, dizziness, HA, CP, SOB, and h/o syncope or seizures this morning. Pt states he has not been drinking any water since arrival at the ED, as he does not want to urinate frequently. Pt also c/o LE swelling about 3-4 mo ago, which spontaneously resolved, and chronic insomnia. Denies abdominal pain, N/V/C/D, vision changes, dysuria, hematuria, night sweats, weight changes, joint stiffness, and muscle weakness at this time. Denies recent travel, sick contacts and prolonged bed rest at this time. 89 y/o  male with PMHx of cataracts, HLD, past MI 26 y/o s/p atherectomy, CAD, varicose veins, h/o hernia x2, h/o fractured ankle 3 mo ago, and h/o BPH, p/w possible syncopal episode. Pt was found lying supine on the train, confused, agitated and (+) urinary incontinence on scene. Pt reports he woke up this morning, went to the bathroom, then went to the train station to go to his dentist appt at 9am on [facility] for an implantation consultation. Pt reveals he remembers getting onto the train and waking up in the ambulance en route to hospital, but cannot recall what happened in between. Pt is currently A&Ox3. Pt c/o bladder fullness, and poor urinary stream x4-5mo. Pt states he is unable to fully empty his bladder, and feels he is always going to the bathroom. Pt states he had a colonoscopy 5 years ago at [facility], which was normal. Pt is unaware when his last prostate exam was. Pt endorses feeling fine this morning. Pt denies lightheadedness, dizziness, HA, CP, SOB, and h/o syncope or seizures this morning. Pt states he has not been drinking any water since arrival at the ED, as he does not want to urinate frequently. Pt also c/o LE swelling about 3-4 mo ago, which spontaneously resolved, and chronic insomnia. Denies abdominal pain, N/V/C/D, vision changes, dysuria, hematuria, night sweats, weight changes, joint stiffness, and muscle weakness at this time. Denies recent travel, sick contacts and prolonged bed rest at this time.    On admission pt's PVR with a bladderscan is 200cc.

## 2019-10-24 NOTE — H&P ADULT - NEGATIVE PSYCHIATRIC SYMPTOMS
no memory loss/no mood swings/no auditory hallucinations/no hyperactivity/no suicidal ideation/no depression/no paranoia/no anxiety/no agitation/no visual hallucinations

## 2019-10-24 NOTE — H&P ADULT - ATTENDING COMMENTS
Syncope of unclear etiology and circumstance, will consider EP eval  ID eval for cellulitis   Medicine consukt

## 2019-10-24 NOTE — H&P ADULT - GENERAL GENITOURINARY SYMPTOMS
urinary hesitancy/See HPI: urinary incontinence following possible syncope; urinary hesitancy/incontinence See HPI: urinary incontinence following possible syncope; urinary hesitancy/increased urinary frequency/incontinence/urinary hesitancy

## 2019-10-24 NOTE — H&P ADULT - NSICDXFAMILYHX_GEN_ALL_CORE_FT
FAMILY HISTORY:  Family history of myocardial infarction, Wife,  9 years ago; smoker, 2x MI  Family history of polyps in the colon, Twin sister; h/o colon polyps-removed  FH: hyperlipidemia, Brother,  at 57 y/o  FHx: coronary artery disease, Brother, Wife (smoker) FAMILY HISTORY:  Family history of myocardial infarction, Wife,  9 years ago; smoker, 2x MI  Family history of polyps in the colon, Twin sister; h/o colon polyps-removed  FH: hyperlipidemia, Brother,  at 59 y/o  FHx: coronary artery disease, Brother, Wife (smoker)

## 2019-10-24 NOTE — ED ADULT TRIAGE NOTE - CHIEF COMPLAINT QUOTE
pt arrives by ambulance, was found laying supine on the train. was confused and combative on scene. pt back to baseline at this time. +urinary incontinence. pt can not recall what happened. denies chest pain/sob/HA/n/v/fever/chills. offers no complaints at this time. fs on scene 111  hx of high cholesterol

## 2019-10-24 NOTE — H&P ADULT - NEGATIVE GASTROINTESTINAL SYMPTOMS
no steatorrhea/no jaundice/no nausea/no vomiting/no constipation/no change in bowel habits/no hematochezia/no hiccoughs/no diarrhea/no flatulence/no abdominal pain/no melena

## 2019-10-24 NOTE — H&P ADULT - NEGATIVE ENMT SYMPTOMS
no vertigo/no nasal obstruction/no hearing difficulty/no ear pain/no nasal congestion/no nasal discharge/no recurrent cold sores/no abnormal taste sensation/no gum bleeding/no dry mouth/no throat pain/no dysphagia/no sinus symptoms/no post-nasal discharge/no nose bleeds/no tinnitus

## 2019-10-24 NOTE — H&P ADULT - PROBLEM SELECTOR PLAN 1
Telemetry monitor  Serial EKG's and CE's x3  Orthostatics  ECHO  Continue baby ASA  PT evaluation Telemetry monitor  Serial EKG's and CE's x3  Orthostatics  ECHO  Continue baby ASA  PT evaluation  SW  FLP, TSH, HgA1c

## 2019-10-24 NOTE — H&P ADULT - MUSCULOSKELETAL
details… detailed exam normal/ROM intact/no joint swelling/no joint erythema/no joint warmth/no calf tenderness/normal strength

## 2019-10-24 NOTE — ED PROVIDER NOTE - NS ED ROS FT
General: Denies fevers or chills  Eyes: Denies vision changes  ENMT: Denies trouble swallowing or speaking, or sore throat  CV: Denies chest pain  Resp: Denies cough or SOB  GI: Denies abdominal pain, nausea, vomiting, diarrhea, or constipation   : +Poor urinary stream. Denies painful urination  Skin: Denies new rashes  Neuro: Denies headache, numbness, or weakness  MSK: Denies recent trauma or joint pain

## 2019-10-24 NOTE — ED PROVIDER NOTE - CLINICAL SUMMARY MEDICAL DECISION MAKING FREE TEXT BOX
Silas, PGY1 - 89M PMH HLD found laying supine on train. VSS, pt currently A&Ox4 but unable to fully recall this morning's events. Neuro exam nonfocal. EKG with hyperacute T waves in V2. Syncope w/u. DDx includes ACS, infection, electrolyte abnormality. Low suspicion for CVA. Will obtain CTH, CT neck, CXR, EKG, labs with troponin. Silas, PGY1 - 89M unknown full PMH but admits to HLD found laying supine on train. VSS, pt currently A&Ox4 but unable to fully recall this morning's events. Neuro exam nonfocal. EKG with hyperacute T waves in V2. Syncope w/u. DDx includes ACS, infection, electrolyte abnormality. Low suspicion for CVA. Will obtain CTH, CT neck, CXR, XR pelvis, EKG, labs with troponin, UA. Silas, PGY1 - 89M PMH HLD, MI (25 years ago, s/p atherectomy) not on ASA found laying supine on train. VSS, pt currently A&Ox4 but unable to fully recall this morning's events. Neuro exam nonfocal. EKG with hyperacute T waves in V2. Syncope w/u. DDx includes ACS, infection, electrolyte abnormality. Low suspicion for CVA. Will obtain CTH, CT neck, CXR, XR pelvis, EKG, labs with troponin, UA.

## 2019-10-24 NOTE — H&P ADULT - NEUROLOGICAL DETAILS
cranial nerves intact/alert and oriented x 3/responds to verbal commands/sensation intact/deep reflexes intact/superficial reflexes intact/responds to pain/no spontaneous movement/normal strength

## 2019-10-25 LAB
ANION GAP SERPL CALC-SCNC: 11 MMO/L — SIGNIFICANT CHANGE UP (ref 7–14)
BACTERIA UR CULT: SIGNIFICANT CHANGE UP
BUN SERPL-MCNC: 12 MG/DL — SIGNIFICANT CHANGE UP (ref 7–23)
CALCIUM SERPL-MCNC: 9.3 MG/DL — SIGNIFICANT CHANGE UP (ref 8.4–10.5)
CHLORIDE SERPL-SCNC: 105 MMOL/L — SIGNIFICANT CHANGE UP (ref 98–107)
CHOLEST SERPL-MCNC: 170 MG/DL — SIGNIFICANT CHANGE UP (ref 120–199)
CK MB BLD-MCNC: 1.9 — SIGNIFICANT CHANGE UP (ref 0–2.5)
CK MB BLD-MCNC: 8.47 NG/ML — HIGH (ref 1–6.6)
CK SERPL-CCNC: 457 U/L — HIGH (ref 30–200)
CO2 SERPL-SCNC: 25 MMOL/L — SIGNIFICANT CHANGE UP (ref 22–31)
CREAT SERPL-MCNC: 0.69 MG/DL — SIGNIFICANT CHANGE UP (ref 0.5–1.3)
GLUCOSE SERPL-MCNC: 81 MG/DL — SIGNIFICANT CHANGE UP (ref 70–99)
HCT VFR BLD CALC: 40.3 % — SIGNIFICANT CHANGE UP (ref 39–50)
HDLC SERPL-MCNC: 59 MG/DL — HIGH (ref 35–55)
HGB BLD-MCNC: 12.8 G/DL — LOW (ref 13–17)
LIPID PNL WITH DIRECT LDL SERPL: 107 MG/DL — SIGNIFICANT CHANGE UP
MCHC RBC-ENTMCNC: 31.8 % — LOW (ref 32–36)
MCHC RBC-ENTMCNC: 31.9 PG — SIGNIFICANT CHANGE UP (ref 27–34)
MCV RBC AUTO: 100.5 FL — HIGH (ref 80–100)
NRBC # FLD: 0 K/UL — SIGNIFICANT CHANGE UP (ref 0–0)
PLATELET # BLD AUTO: 128 K/UL — LOW (ref 150–400)
PMV BLD: 14.5 FL — HIGH (ref 7–13)
POTASSIUM SERPL-MCNC: 4.2 MMOL/L — SIGNIFICANT CHANGE UP (ref 3.5–5.3)
POTASSIUM SERPL-SCNC: 4.2 MMOL/L — SIGNIFICANT CHANGE UP (ref 3.5–5.3)
RBC # BLD: 4.01 M/UL — LOW (ref 4.2–5.8)
RBC # FLD: 14.7 % — HIGH (ref 10.3–14.5)
SODIUM SERPL-SCNC: 141 MMOL/L — SIGNIFICANT CHANGE UP (ref 135–145)
SPECIMEN SOURCE: SIGNIFICANT CHANGE UP
TRIGL SERPL-MCNC: 74 MG/DL — SIGNIFICANT CHANGE UP (ref 10–149)
WBC # BLD: 6.96 K/UL — SIGNIFICANT CHANGE UP (ref 3.8–10.5)
WBC # FLD AUTO: 6.96 K/UL — SIGNIFICANT CHANGE UP (ref 3.8–10.5)

## 2019-10-25 RX ADMIN — SODIUM CHLORIDE 75 MILLILITER(S): 9 INJECTION INTRAMUSCULAR; INTRAVENOUS; SUBCUTANEOUS at 01:08

## 2019-10-25 RX ADMIN — SODIUM CHLORIDE 75 MILLILITER(S): 9 INJECTION INTRAMUSCULAR; INTRAVENOUS; SUBCUTANEOUS at 20:13

## 2019-10-25 RX ADMIN — Medication 100 MILLIGRAM(S): at 12:33

## 2019-10-25 RX ADMIN — Medication 81 MILLIGRAM(S): at 12:34

## 2019-10-25 RX ADMIN — Medication 100 MILLIGRAM(S): at 01:08

## 2019-10-25 RX ADMIN — Medication 100 MILLIGRAM(S): at 20:12

## 2019-10-25 RX ADMIN — HEPARIN SODIUM 5000 UNIT(S): 5000 INJECTION INTRAVENOUS; SUBCUTANEOUS at 21:05

## 2019-10-25 RX ADMIN — TAMSULOSIN HYDROCHLORIDE 0.4 MILLIGRAM(S): 0.4 CAPSULE ORAL at 21:05

## 2019-10-25 NOTE — CONSULT NOTE ADULT - SUBJECTIVE AND OBJECTIVE BOX
Patient is a 89y old  Male who presents with a chief complaint of possible syncope (25 Oct 2019 15:44)      HPI:    89 y/o  male with PMHx of cataracts, HLD, past MI 26 y/o s/p atherectomy, CAD, varicose veins, h/o hernia x2, h/o fractured ankle 3 mo ago, and h/o BPH, p/w possible syncopal episode. Pt was found lying supine on the train, confused, agitated and (+) urinary incontinence on scene. Pt reports he woke up this morning, went to the bathroom, then went to the train station to go to his dentist appt at 9am on [facility] for an implantation consultation. Pt reveals he remembers getting onto the train and waking up in the ambulance en route to hospital, but cannot recall what happened in between.     Pt is currently A&Ox3. Pt c/o bladder fullness, and poor urinary stream x4-5mo. Pt states he is unable to fully empty his bladder, and feels he is always going to the bathroom. Pt states he had a colonoscopy 5 years ago at [facility], which was normal. Pt is unaware when his last prostate exam was. Pt endorses feeling fine this morning. Pt denies lightheadedness, dizziness, HA, CP, SOB, and h/o syncope or seizures this morning. Pt states he has not been drinking any water since arrival at the ED, as he does not want to urinate frequently. Pt also c/o LE swelling about 3-4 mo ago, which spontaneously resolved, and chronic insomnia. Denies abdominal pain, N/V/C/D, vision changes, dysuria, hematuria, night sweats, weight changes, joint stiffness, and muscle weakness at this time. Denies recent travel, sick contacts and prolonged bed rest at this time.    On admission pt's PVR with a bladder scan is 200cc. (24 Oct 2019 15:14)    Pt a/w syncopal episode, on tele monitoring for syncope, r/o ACS, hypovolemia dehydration and acute cellulitis LLE.  Pt is currently on cefazolin.  ID consult called for further recommendations.           REVIEW OF SYSTEMS:    CONSTITUTIONAL: No fever, weight loss, or fatigue  EYES: No eye pain, visual disturbances, or discharge  ENMT:  No sore throat  NECK: No pain or stiffness  RESPIRATORY: No cough, wheezing, chills or hemoptysis; No shortness of breath  CARDIOVASCULAR: No chest pain, palpitations, dizziness, or leg swelling  GASTROINTESTINAL: No abdominal or epigastric pain. No nausea, vomiting, or hematemesis; No diarrhea or constipation. No melena or hematochezia.  GENITOURINARY: No dysuria, frequency, hematuria, or incontinence  NEUROLOGICAL: No headaches, memory loss, loss of strength, numbness, or tremors  SKIN: No itching, burning, rashes, or lesions   LYMPH NODES: No enlarged glands  MUSCULOSKELETAL: No joint pain or swelling; No muscle, back, or extremity pain      PAST MEDICAL & SURGICAL HISTORY:  History of BPH  History of ankle fracture: 3 y/o  H/O varicose veins  H/O hernia repair: x2;  and   Cataracts, bilateral: eye surgery last year B/L  Coronary artery disease  History of myocardial infarction: MI 26 y/o s/p atherectomy at age 66 y/o; no stents placed  Hyperlipidemia  H/O eye surgery: within last year  H/O hernia repair: 2x; ,   S/P laparoscopic appendectomy: 1947  History of atherectomy: age 66 y/o post MI 25 years go      Allergies    No Known Allergies    Intolerances        FAMILY HISTORY:  FHx: coronary artery disease: Brother, Wife (smoker)  Family history of myocardial infarction: Wife,  9 years ago; smoker, 2x MI  FH: hyperlipidemia: Brother,  at 59 y/o  Family history of polyps in the colon: Twin sister; h/o colon polyps-removed      SOCIAL HISTORY:        MEDICATIONS  (STANDING):  aspirin enteric coated 81 milliGRAM(s) Oral daily  ceFAZolin   IVPB 1000 milliGRAM(s) IV Intermittent every 8 hours  heparin  Injectable 5000 Unit(s) SubCutaneous every 8 hours  influenza   Vaccine 0.5 milliLiter(s) IntraMuscular once  sodium chloride 0.9%. 1000 milliLiter(s) (75 mL/Hr) IV Continuous <Continuous>  tamsulosin 0.4 milliGRAM(s) Oral at bedtime    MEDICATIONS  (PRN):      Vital Signs Last 24 Hrs  T(C): 36.7 (25 Oct 2019 16:39), Max: 37.1 (25 Oct 2019 14:13)  T(F): 98.1 (25 Oct 2019 16:39), Max: 98.7 (25 Oct 2019 14:13)  HR: 56 (25 Oct 2019 16:39) (55 - 76)  BP: 103/53 (25 Oct 2019 16:39) (96/48 - 126/72)  BP(mean): --  RR: 18 (25 Oct 2019 16:39) (17 - 18)  SpO2: 98% (25 Oct 2019 16:39) (97% - 100%)    PHYSICAL EXAM:    GENERAL: NAD, well-groomed  HEAD:  Atraumatic, Normocephalic  EYES: EOMI, PERRLA, conjunctiva and sclera clear  ENMT: No tonsillar erythema, exudates, or enlargement; Moist mucous membranes  NECK: Supple, No JVD  CHEST/LUNG: Clear to percussion bilaterally; No rales, rhonchi, wheezing, or rubs  HEART: Regular rate and rhythm; No murmurs, rubs, or gallops  ABDOMEN: Soft, Nontender, Nondistended; Bowel sounds present  EXTREMITIES:  2+ Peripheral Pulses, No clubbing, cyanosis, or edema  LYMPH: No lymphadenopathy noted  SKIN: No rashes or lesions    LABS:  CBC Full  -  ( 25 Oct 2019 05:26 )  WBC Count : 6.96 K/uL  RBC Count : 4.01 M/uL  Hemoglobin : 12.8 g/dL  Hematocrit : 40.3 %  Platelet Count - Automated : 128 K/uL  Mean Cell Volume : 100.5 fL  Mean Cell Hemoglobin : 31.9 pg  Mean Cell Hemoglobin Concentration : 31.8 %  Auto Neutrophil # : x  Auto Lymphocyte # : x  Auto Monocyte # : x  Auto Eosinophil # : x  Auto Basophil # : x  Auto Neutrophil % : x  Auto Lymphocyte % : x  Auto Monocyte % : x  Auto Eosinophil % : x  Auto Basophil % : x      10-25    141  |  105  |  12  ----------------------------<  81  4.2   |  25  |  0.69    Ca    9.3      25 Oct 2019 05:26    TPro  7.8  /  Alb  4.4  /  TBili  0.4  /  DBili  x   /  AST  23  /  ALT  10  /  AlkPhos  67  10-24      LIVER FUNCTIONS - ( 24 Oct 2019 12:15 )  Alb: 4.4 g/dL / Pro: 7.8 g/dL / ALK PHOS: 67 u/L / ALT: 10 u/L / AST: 23 u/L / GGT: x                   MICROBIOLOGY:        Urinalysis Basic - ( 24 Oct 2019 12:12 )    Color: LIGHT YELLOW / Appearance: CLEAR / S.013 / pH: 7.5  Gluc: NEGATIVE / Ketone: NEGATIVE  / Bili: NEGATIVE / Urobili: NORMAL   Blood: NEGATIVE / Protein: 20 / Nitrite: NEGATIVE   Leuk Esterase: NEGATIVE / RBC: 0-2 / WBC 0-2   Sq Epi: OCC / Non Sq Epi: x / Bacteria: NEGATIVE      Culture - Urine (10.24.19 @ 12:48)    Culture - Urine:   NO GROWTH AT 24 HOURS    Specimen Source: URINE MIDSTREAM              RADIOLOGY:    < from: CT Cervical Spine No Cont (10.24.19 @ 11:41) >  Cervical spine CT:    There is no evidence for acute fracture, subluxation, or prevertebral   hematoma. Severe multilevel disc space narrowing is present with advanced   endplate degenerative changes.    Facet degenerative changes are present including cystic change.    Several disc bulges/disc osteophyte complexes are present. The associated   degree of spinal canal stenosis is not well evaluated with CT technique.    IMPRESSION:    1. On the headCT, there is no evidence for calvarial fracture or acute   intracranial hemorrhage. If symptoms persist, consider short interval   follow-up head CT or brain MRI follow-up if there are no MRI   contraindications.    2. On the cervical spine CT, there is no evidence for acute cervical   spine fracture. Degenerative changes as described. If symptoms persist,   consider cervical spine MRI follow-up if there are no MRI   contraindications.    < end of copied text >        < from: Xray Pelvis AP only (10.24.19 @ 10:52) >  COMPARISON:  None available      IMPRESSION:  No acute fracture or dislocation.    < end of copied text > Patient is a 89y old  Male who presents with a chief complaint of possible syncope (25 Oct 2019 15:44)      HPI:    89 y/o  male with PMHx of cataracts, HLD, past MI 26 y/o s/p atherectomy, CAD, varicose veins, h/o hernia x2, h/o fractured ankle 3 mo ago, and h/o BPH, p/w possible syncopal episode. Pt was found lying supine on the train, confused, agitated and (+) urinary incontinence on scene. Pt reports he woke up this morning, went to the bathroom, then went to the train station to go to his dentist appt at 9am on [facility] for an implantation consultation. Pt reveals he remembers getting onto the train and waking up in the ambulance en route to hospital, but cannot recall what happened in between.     Pt is currently A&Ox3. Pt c/o bladder fullness, and poor urinary stream x4-5mo. Pt states he is unable to fully empty his bladder, and feels he is always going to the bathroom. Pt states he had a colonoscopy 5 years ago at [facility], which was normal. Pt is unaware when his last prostate exam was. Pt endorses feeling fine this morning. Pt denies lightheadedness, dizziness, HA, CP, SOB, and h/o syncope or seizures this morning. Pt states he has not been drinking any water since arrival at the ED, as he does not want to urinate frequently. Pt also c/o LE swelling about 3-4 mo ago, which spontaneously resolved, and chronic insomnia. Denies abdominal pain, N/V/C/D, vision changes, dysuria, hematuria, night sweats, weight changes, joint stiffness, and muscle weakness at this time. Denies recent travel, sick contacts and prolonged bed rest at this time.    On admission pt's PVR with a bladder scan is 200cc. (24 Oct 2019 15:14)    Pt a/w syncopal episode, on tele monitoring for syncope, r/o ACS, hypovolemia dehydration and acute cellulitis LLE.  Pt is currently on cefazolin.  ID consult called for further recommendations.     ER vss, pt afebrile.  No leukocytosis.            REVIEW OF SYSTEMS:    CONSTITUTIONAL: No fever, weight loss, or fatigue  EYES: No eye pain, visual disturbances, or discharge  ENMT:  No sore throat  NECK: No pain or stiffness  RESPIRATORY: No cough, wheezing, chills or hemoptysis; No shortness of breath  CARDIOVASCULAR: No chest pain, palpitations, dizziness, or leg swelling  GASTROINTESTINAL: No abdominal or epigastric pain. No nausea, vomiting, or hematemesis; No diarrhea or constipation. No melena or hematochezia.  GENITOURINARY: No dysuria, frequency, hematuria, or incontinence  NEUROLOGICAL: No headaches, memory loss, loss of strength, numbness, or tremors  SKIN: No itching, burning, rashes, or lesions   LYMPH NODES: No enlarged glands  MUSCULOSKELETAL: No joint pain or swelling; No muscle, back, or extremity pain      PAST MEDICAL & SURGICAL HISTORY:  History of BPH  History of ankle fracture: 3 y/o  H/O varicose veins  H/O hernia repair: x2;  and   Cataracts, bilateral: eye surgery last year B/L  Coronary artery disease  History of myocardial infarction: MI 26 y/o s/p atherectomy at age 66 y/o; no stents placed  Hyperlipidemia  H/O eye surgery: within last year  H/O hernia repair: 2x; ,   S/P laparoscopic appendectomy: 1947  History of atherectomy: age 66 y/o post MI 25 years go      Allergies    No Known Allergies    Intolerances        FAMILY HISTORY:  FHx: coronary artery disease: Brother, Wife (smoker)  Family history of myocardial infarction: Wife,  9 years ago; smoker, 2x MI  FH: hyperlipidemia: Brother,  at 59 y/o  Family history of polyps in the colon: Twin sister; h/o colon polyps-removed      SOCIAL HISTORY:        MEDICATIONS  (STANDING):  aspirin enteric coated 81 milliGRAM(s) Oral daily  ceFAZolin   IVPB 1000 milliGRAM(s) IV Intermittent every 8 hours  heparin  Injectable 5000 Unit(s) SubCutaneous every 8 hours  influenza   Vaccine 0.5 milliLiter(s) IntraMuscular once  sodium chloride 0.9%. 1000 milliLiter(s) (75 mL/Hr) IV Continuous <Continuous>  tamsulosin 0.4 milliGRAM(s) Oral at bedtime    MEDICATIONS  (PRN):      Vital Signs Last 24 Hrs  T(C): 36.7 (25 Oct 2019 16:39), Max: 37.1 (25 Oct 2019 14:13)  T(F): 98.1 (25 Oct 2019 16:39), Max: 98.7 (25 Oct 2019 14:13)  HR: 56 (25 Oct 2019 16:39) (55 - 76)  BP: 103/53 (25 Oct 2019 16:39) (96/48 - 126/72)  BP(mean): --  RR: 18 (25 Oct 2019 16:39) (17 - 18)  SpO2: 98% (25 Oct 2019 16:39) (97% - 100%)    PHYSICAL EXAM:    GENERAL: NAD, well-groomed  HEAD:  Atraumatic, Normocephalic  EYES: EOMI, PERRLA, conjunctiva and sclera clear  ENMT: No tonsillar erythema, exudates, or enlargement; Moist mucous membranes  NECK: Supple, No JVD  CHEST/LUNG: Clear to percussion bilaterally; No rales, rhonchi, wheezing, or rubs  HEART: Regular rate and rhythm; No murmurs, rubs, or gallops  ABDOMEN: Soft, Nontender, Nondistended; Bowel sounds present  EXTREMITIES:  2+ Peripheral Pulses, No clubbing, cyanosis, or edema  LYMPH: No lymphadenopathy noted  SKIN: No rashes or lesions    LABS:  CBC Full  -  ( 25 Oct 2019 05:26 )  WBC Count : 6.96 K/uL  RBC Count : 4.01 M/uL  Hemoglobin : 12.8 g/dL  Hematocrit : 40.3 %  Platelet Count - Automated : 128 K/uL  Mean Cell Volume : 100.5 fL  Mean Cell Hemoglobin : 31.9 pg  Mean Cell Hemoglobin Concentration : 31.8 %  Auto Neutrophil # : x  Auto Lymphocyte # : x  Auto Monocyte # : x  Auto Eosinophil # : x  Auto Basophil # : x  Auto Neutrophil % : x  Auto Lymphocyte % : x  Auto Monocyte % : x  Auto Eosinophil % : x  Auto Basophil % : x      10-25    141  |  105  |  12  ----------------------------<  81  4.2   |  25  |  0.69    Ca    9.3      25 Oct 2019 05:26    TPro  7.8  /  Alb  4.4  /  TBili  0.4  /  DBili  x   /  AST  23  /  ALT  10  /  AlkPhos  67  10-24      LIVER FUNCTIONS - ( 24 Oct 2019 12:15 )  Alb: 4.4 g/dL / Pro: 7.8 g/dL / ALK PHOS: 67 u/L / ALT: 10 u/L / AST: 23 u/L / GGT: x                   MICROBIOLOGY:        Urinalysis Basic - ( 24 Oct 2019 12:12 )    Color: LIGHT YELLOW / Appearance: CLEAR / S.013 / pH: 7.5  Gluc: NEGATIVE / Ketone: NEGATIVE  / Bili: NEGATIVE / Urobili: NORMAL   Blood: NEGATIVE / Protein: 20 / Nitrite: NEGATIVE   Leuk Esterase: NEGATIVE / RBC: 0-2 / WBC 0-2   Sq Epi: OCC / Non Sq Epi: x / Bacteria: NEGATIVE      Culture - Urine (10.24.19 @ 12:48)    Culture - Urine:   NO GROWTH AT 24 HOURS    Specimen Source: URINE MIDSTREAM              RADIOLOGY:    < from: CT Cervical Spine No Cont (10.24.19 @ 11:41) >  Cervical spine CT:    There is no evidence for acute fracture, subluxation, or prevertebral   hematoma. Severe multilevel disc space narrowing is present with advanced   endplate degenerative changes.    Facet degenerative changes are present including cystic change.    Several disc bulges/disc osteophyte complexes are present. The associated   degree of spinal canal stenosis is not well evaluated with CT technique.    IMPRESSION:    1. On the headCT, there is no evidence for calvarial fracture or acute   intracranial hemorrhage. If symptoms persist, consider short interval   follow-up head CT or brain MRI follow-up if there are no MRI   contraindications.    2. On the cervical spine CT, there is no evidence for acute cervical   spine fracture. Degenerative changes as described. If symptoms persist,   consider cervical spine MRI follow-up if there are no MRI   contraindications.    < end of copied text >        < from: Xray Pelvis AP only (10.24.19 @ 10:52) >  COMPARISON:  None available      IMPRESSION:  No acute fracture or dislocation.    < end of copied text >

## 2019-10-25 NOTE — CONSULT NOTE ADULT - ASSESSMENT
89 y/o  male with PMHx of cataracts, HLD, past MI 24 y/o s/p atherectomy, CAD, varicose veins, h/o hernia x2, h/o fractured ankle 3 mo ago, and h/o BPH, p/w possible syncopal episode. Pt was found lying supine on the train, confused, agitated and (+) urinary incontinence on scene. Pt reports he woke up this morning, went to the bathroom, then went to the train station to go to his dentist appt at 9am on [facility] for an implantation consultation. Pt reveals he remembers getting onto the train and waking up in the ambulance en route to hospital, but cannot recall what happened in between.     Pt is currently A&Ox3. Pt c/o bladder fullness, and poor urinary stream x4-5mo. Pt states he is unable to fully empty his bladder, and feels he is always going to the bathroom. Pt states he had a colonoscopy 5 years ago at [facility], which was normal. Pt is unaware when his last prostate exam was. Pt endorses feeling fine this morning. Pt denies lightheadedness, dizziness, HA, CP, SOB, and h/o syncope or seizures this morning. Pt states he has not been drinking any water since arrival at the ED, as he does not want to urinate frequently. Pt also c/o LE swelling about 3-4 mo ago, which spontaneously resolved, and chronic insomnia. Denies abdominal pain, N/V/C/D, vision changes, dysuria, hematuria, night sweats, weight changes, joint stiffness, and muscle weakness at this time. Denies recent travel, sick contacts and prolonged bed rest at this time.    On admission pt's PVR with a bladder scan is 200cc. (24 Oct 2019 15:14)    Pt a/w syncopal episode, on tele monitoring for syncope, r/o ACS, hypovolemia dehydration and acute cellulitis LLE.  Pt is currently on cefazolin.  ID consult called for further recommendations.     ER vss, pt afebrile.  No leukocytosis.       LLE cellulitis:    - Pt with increased erythema over LLE.  No open wounds, fluctuance noted.  No TTP.  Cont cefazolin, monitor for clinical improvement.    - If pt spikes fever >100.4, send bcx    * Pt a/w syncope, on telemetry monitoring, r/o ACS.  Serial EKGs and cardiac enzymes, check orthostatics.  Pending echo    Will follow,    Isela Padilla  [phone]

## 2019-10-25 NOTE — CHART NOTE - NSCHARTNOTEFT_GEN_A_CORE
Sinus matthias with premature complexes in the 40 - 50s during sleep.  Rates appear to improve during ambulation per nursing; will reassess when ambulating again.

## 2019-10-25 NOTE — PHYSICAL THERAPY INITIAL EVALUATION ADULT - PERTINENT HX OF CURRENT PROBLEM, REHAB EVAL
Pt. is a 89 year old male admitted to [facility] with syncope and collapse. PMH: BPH, CAD, myocardial infarction.

## 2019-10-25 NOTE — CONSULT NOTE ADULT - SUBJECTIVE AND OBJECTIVE BOX
HISTORY OF PRESENT ILLNESS: HPI:    89 y/o  male with PMHx of cataracts, HLD, past MI 24 y/o s/p atherectomy, CAD, varicose veins, h/o hernia x2, h/o fractured ankle 3 mo ago, and h/o BPH, p/w possible syncopal episode. Pt was found lying supine on the train, confused, agitated and (+) urinary incontinence on scene. Patient denies prior hx of syncope or seizures.  Pt endorses feeling fine this morning. Pt denies lightheadedness, dizziness, HA, CP, SOB. Reports he keeps his fluid intake to a minimum because of enlarged prostate and urinary frequency. Pt also c/o LE swelling about 3-4 mo ago, which spontaneously resolved, and chronic insomnia.   Denies palpitations, lightheadedness, dizziness. Electrophysiology called for syncope work up.       PAST MEDICAL & SURGICAL HISTORY:  History of BPH  History of ankle fracture: 3 y/o  H/O varicose veins  H/O hernia repair: x2;  and   Cataracts, bilateral: eye surgery last year B/L  Coronary artery disease  History of myocardial infarction: MI 24 y/o s/p atherectomy at age 66 y/o; no stents placed  Hyperlipidemia  H/O eye surgery: within last year  H/O hernia repair: 2x; ,   S/P laparoscopic appendectomy: 1947  History of atherectomy: age 66 y/o post MI 25 years go      MEDICATIONS:  MEDICATIONS  (STANDING):  aspirin enteric coated 81 milliGRAM(s) Oral daily  ceFAZolin   IVPB 1000 milliGRAM(s) IV Intermittent every 8 hours  heparin  Injectable 5000 Unit(s) SubCutaneous every 8 hours  influenza   Vaccine 0.5 milliLiter(s) IntraMuscular once  sodium chloride 0.9%. 1000 milliLiter(s) (75 mL/Hr) IV Continuous <Continuous>  tamsulosin 0.4 milliGRAM(s) Oral at bedtime      Allergies    No Known Allergies    Intolerances    FAMILY HISTORY:  FHx: coronary artery disease: Brother, Wife (smoker)  Family history of myocardial infarction: Wife,  9 years ago; smoker, 2x MI  FH: hyperlipidemia: Brother,  at 57 y/o  Family history of polyps in the colon: Twin sister; h/o colon polyps-removed    Non-contributary for premature coronary disease or sudden cardiac death    SOCIAL HISTORY:    [ X] Non-smoker  [ ] Smoker  [ ] Alcohol      REVIEW OF SYSTEMS:  [ ]chest pain  [  ]shortness of breath  [  ]palpitations  [ X ]syncope  [ ]near syncope [ ]upper extremity weakness   [ ] lower extremity weakness  [  ]diplopia  [  ]altered mental status   [  ]fevers  [ ]chills [ ]nausea  [ ]vomitting  [  ]dysphagia    [ ]abdominal pain  [ ]melena  [ ]BRBPR    [  ]epistaxis  [  ]rash    [ ]lower extremity edema        [ X] All others negative	  [ ] Unable to obtain    LABS:	 	    CARDIAC MARKERS:  CARDIAC MARKERS ( 25 Oct 2019 05:26 )  x     / x     / 457 u/L / 8.47 ng/mL / x      CARDIAC MARKERS ( 24 Oct 2019 15:48 )  x     / x     / 314 u/L / 7.94 ng/mL / x      CARDIAC MARKERS ( 24 Oct 2019 12:15 )  x     / x     / 197 u/L / x     / x      CARDIAC MARKERS ( 24 Oct 2019 10:15 )  x     / x     / Test not performed SPECIMEN GROSSLY HEMOLYZED u/L / x     / x                              12.8   6.96  )-----------( 128      ( 25 Oct 2019 05:26 )             40.3     Hb Trend: 12.8<--    10-25    141  |  105  |  12  ----------------------------<  81  4.2   |  25  |  0.69    Ca    9.3      25 Oct 2019 05:26    TPro  7.8  /  Alb  4.4  /  TBili  0.4  /  DBili  x   /  AST  23  /  ALT  10  /  AlkPhos  67  10-24    Creatinine Trend: 0.69<--, 0.66<--, 0.62<--    Coags:      proBNP:   Lipid Profile:   HgA1c: Hemoglobin A1C, Whole Blood: 5.8 % (10-24 @ 15:48)    TSH: Thyroid Stimulating Hormone, Serum: 0.91 uIU/mL (10-24 @ 15:48)        PHYSICAL EXAM:  T(C): 37.1 (10-25-19 @ 14:13), Max: 37.1 (10-25-19 @ 14:13)  HR: 62 (10-25-19 @ 14:13) (55 - 76)  BP: 96/48 (10-25-19 @ 14:13) (96/48 - 126/72)  RR: 18 (10-25-19 @ 14:13) (17 - 18)  SpO2: 100% (10-25-19 @ 14:13) (97% - 100%)  Wt(kg): --  I&O's Summary    24 Oct 2019 07:01  -  25 Oct 2019 07:00  --------------------------------------------------------  IN: 1175 mL / OUT: 1175 mL / NET: 0 mL        Gen: Appears well in NAD  HEENT:  (-)icterus (-)pallor  CV: N S1 S2 1/6 MONICA (+)2 Pulses B/l  Resp:  Clear to auscultation B/L, normal effort  GI: (+) BS Soft, NT, ND  Lymph:  (-)Edema, (-)obvious lymphadenopathy  Skin: Warm to touch, Normal turgor  Psych: Appropriate mood and affect      TELEMETRY: 	 NSR - SB      ECG:  	   NSR , normal MD interval     RADIOLOGY:         CXR:   < from: Xray Chest 1 View- PORTABLE-Urgent (10.24. @ 10:51) >    IMPRESSION:  Clear lungs with large hiatal hernia.      STACY GAMBOA M.D., ATTENDING RADIOLOGIST  This document has been electronically signed. Oct 24 2019 11:09AM    < end of copied text >    ASSESSMENT/PLAN: 	    89 y/o  male with PMHx of cataracts, HLD, past MI 24 y/o s/p atherectomy, CAD, varicose veins, h/o hernia x2, h/o fractured ankle 3 mo ago, and h/o BPH, p/w  syncopal episode, admitted to [facility] for syncope, r/o ACS, hypovolemia dehydration and acute cellulitis LLE.    -- no anginal symptoms, HISTORY OF PRESENT ILLNESS: HPI:    89 y/o  male with PMHx of cataracts, HLD, past MI 26 y/o s/p atherectomy, CAD, varicose veins, h/o hernia x2, h/o fractured ankle 3 mo ago, and h/o BPH, p/w possible syncopal episode. Pt was found lying supine on the train, confused, agitated and (+) urinary incontinence on scene. Patient denies prior hx of syncope or seizures.  Pt endorses feeling fine this morning. Pt denies lightheadedness, dizziness, HA, CP, SOB. Reports he keeps his fluid intake to a minimum because of enlarged prostate and urinary frequency. Pt also c/o LE swelling about 3-4 mo ago, which spontaneously resolved, and chronic insomnia.   Denies palpitations, lightheadedness, dizziness. Electrophysiology called for syncope work up.       PAST MEDICAL & SURGICAL HISTORY:  History of BPH  History of ankle fracture: 3 y/o  H/O varicose veins  H/O hernia repair: x2;  and   Cataracts, bilateral: eye surgery last year B/L  Coronary artery disease  History of myocardial infarction: MI 26 y/o s/p atherectomy at age 66 y/o; no stents placed  Hyperlipidemia  H/O eye surgery: within last year  H/O hernia repair: 2x; ,   S/P laparoscopic appendectomy: 1947  History of atherectomy: age 66 y/o post MI 25 years go      MEDICATIONS:  MEDICATIONS  (STANDING):  aspirin enteric coated 81 milliGRAM(s) Oral daily  ceFAZolin   IVPB 1000 milliGRAM(s) IV Intermittent every 8 hours  heparin  Injectable 5000 Unit(s) SubCutaneous every 8 hours  influenza   Vaccine 0.5 milliLiter(s) IntraMuscular once  sodium chloride 0.9%. 1000 milliLiter(s) (75 mL/Hr) IV Continuous <Continuous>  tamsulosin 0.4 milliGRAM(s) Oral at bedtime      Allergies    No Known Allergies    Intolerances    FAMILY HISTORY:  FHx: coronary artery disease: Brother, Wife (smoker)  Family history of myocardial infarction: Wife,  9 years ago; smoker, 2x MI  FH: hyperlipidemia: Brother,  at 57 y/o  Family history of polyps in the colon: Twin sister; h/o colon polyps-removed    Non-contributary for premature coronary disease or sudden cardiac death    SOCIAL HISTORY:    [ X] Non-smoker  [ ] Smoker  [ ] Alcohol      REVIEW OF SYSTEMS:  [ ]chest pain  [  ]shortness of breath  [  ]palpitations  [ X ]syncope  [ ]near syncope [ ]upper extremity weakness   [ ] lower extremity weakness  [  ]diplopia  [  ]altered mental status   [  ]fevers  [ ]chills [ ]nausea  [ ]vomitting  [  ]dysphagia    [ ]abdominal pain  [ ]melena  [ ]BRBPR    [  ]epistaxis  [  ]rash    [ ]lower extremity edema        [ X] All others negative	  [ ] Unable to obtain    LABS:	 	    CARDIAC MARKERS:  CARDIAC MARKERS ( 25 Oct 2019 05:26 )  x     / x     / 457 u/L / 8.47 ng/mL / x      CARDIAC MARKERS ( 24 Oct 2019 15:48 )  x     / x     / 314 u/L / 7.94 ng/mL / x      CARDIAC MARKERS ( 24 Oct 2019 12:15 )  x     / x     / 197 u/L / x     / x      CARDIAC MARKERS ( 24 Oct 2019 10:15 )  x     / x     / Test not performed SPECIMEN GROSSLY HEMOLYZED u/L / x     / x                              12.8   6.96  )-----------( 128      ( 25 Oct 2019 05:26 )             40.3     Hb Trend: 12.8<--    10-25    141  |  105  |  12  ----------------------------<  81  4.2   |  25  |  0.69    Ca    9.3      25 Oct 2019 05:26    TPro  7.8  /  Alb  4.4  /  TBili  0.4  /  DBili  x   /  AST  23  /  ALT  10  /  AlkPhos  67  10-24    Creatinine Trend: 0.69<--, 0.66<--, 0.62<--    Coags:      proBNP:   Lipid Profile:   HgA1c: Hemoglobin A1C, Whole Blood: 5.8 % (10-24 @ 15:48)    TSH: Thyroid Stimulating Hormone, Serum: 0.91 uIU/mL (10-24 @ 15:48)        PHYSICAL EXAM:  T(C): 37.1 (10-25-19 @ 14:13), Max: 37.1 (10-25-19 @ 14:13)  HR: 62 (10-25-19 @ 14:13) (55 - 76)  BP: 96/48 (10-25-19 @ 14:13) (96/48 - 126/72)  RR: 18 (10-25-19 @ 14:13) (17 - 18)  SpO2: 100% (10-25-19 @ 14:13) (97% - 100%)  Wt(kg): --  I&O's Summary    24 Oct 2019 07:01  -  25 Oct 2019 07:00  --------------------------------------------------------  IN: 1175 mL / OUT: 1175 mL / NET: 0 mL        Gen: Appears well in NAD  HEENT:  (-)icterus (-)pallor  CV: N S1 S2 1/6 MONICA (+)2 Pulses B/l  Resp:  Clear to auscultation B/L, normal effort  GI: (+) BS Soft, NT, ND  Lymph:  (-)Edema, (-)obvious lymphadenopathy  Skin: Warm to touch, Normal turgor  Psych: Appropriate mood and affect      TELEMETRY: 	 NSR - SB      ECG:  	   NSR , normal NV interval     RADIOLOGY:         CXR:   < from: Xray Chest 1 View- PORTABLE-Urgent (10.24.19 @ 10:51) >    IMPRESSION:  Clear lungs with large hiatal hernia.      STACY GAMBOA M.D., ATTENDING RADIOLOGIST  This document has been electronically signed. Oct 24 2019 11:09AM    < end of copied text >      ASSESSMENT/PLAN: 	    89 y/o  male with PMHx of cataracts, HLD, past MI 26 y/o s/p atherectomy, CAD, varicose veins, h/o hernia x2, h/o fractured ankle 3 mo ago, and h/o BPH, p/w  syncopal episode, admitted to tele for syncope, r/o ACS, hypovolemia dehydration and acute cellulitis LLE.    -- no anginal symptoms, palps or dizziness  -- would check echo eval structural heart  -- indeterminant trop, f/u per cards  -- check orthostatics  -- cont tele, monitor for malignant bradyarrhythmia   -- may need OP event recorder  -- further EP work up to be discussed with attending HISTORY OF PRESENT ILLNESS: HPI:    89 y/o  male with PMHx of cataracts, HLD, past MI 24 y/o s/p atherectomy, CAD, varicose veins, h/o hernia x2, h/o fractured ankle 3 mo ago, and h/o BPH, p/w possible syncopal episode. Pt was found lying supine on the train, confused, agitated and (+) urinary incontinence on scene. Patient denies prior hx of syncope or seizures.  Pt endorses feeling fine this morning. Pt denies lightheadedness, dizziness, HA, CP, SOB. Reports he keeps his fluid intake to a minimum because of enlarged prostate and urinary frequency. Pt also c/o LE swelling about 3-4 mo ago, which spontaneously resolved, and chronic insomnia.   Denies palpitations, lightheadedness, dizziness. Electrophysiology called for syncope work up.       PAST MEDICAL & SURGICAL HISTORY:  History of BPH  History of ankle fracture: 3 y/o  H/O varicose veins  H/O hernia repair: x2;  and   Cataracts, bilateral: eye surgery last year B/L  Coronary artery disease  History of myocardial infarction: MI 24 y/o s/p atherectomy at age 68 y/o; no stents placed  Hyperlipidemia  H/O eye surgery: within last year  H/O hernia repair: 2x; ,   S/P laparoscopic appendectomy: 1947  History of atherectomy: age 68 y/o post MI 25 years go      MEDICATIONS:  MEDICATIONS  (STANDING):  aspirin enteric coated 81 milliGRAM(s) Oral daily  ceFAZolin   IVPB 1000 milliGRAM(s) IV Intermittent every 8 hours  heparin  Injectable 5000 Unit(s) SubCutaneous every 8 hours  influenza   Vaccine 0.5 milliLiter(s) IntraMuscular once  sodium chloride 0.9%. 1000 milliLiter(s) (75 mL/Hr) IV Continuous <Continuous>  tamsulosin 0.4 milliGRAM(s) Oral at bedtime      Allergies    No Known Allergies    Intolerances    FAMILY HISTORY:  FHx: coronary artery disease: Brother, Wife (smoker)  Family history of myocardial infarction: Wife,  9 years ago; smoker, 2x MI  FH: hyperlipidemia: Brother,  at 57 y/o  Family history of polyps in the colon: Twin sister; h/o colon polyps-removed    Non-contributary for premature coronary disease or sudden cardiac death    SOCIAL HISTORY:    [ X] Non-smoker  [ ] Smoker  [ ] Alcohol      REVIEW OF SYSTEMS:  [ ]chest pain  [  ]shortness of breath  [  ]palpitations  [ X ]syncope  [ ]near syncope [ ]upper extremity weakness   [ ] lower extremity weakness  [  ]diplopia  [  ]altered mental status   [  ]fevers  [ ]chills [ ]nausea  [ ]vomitting  [  ]dysphagia    [ ]abdominal pain  [ ]melena  [ ]BRBPR    [  ]epistaxis  [  ]rash    [ ]lower extremity edema        [ X] All others negative	  [ ] Unable to obtain    LABS:	 	    CARDIAC MARKERS:  CARDIAC MARKERS ( 25 Oct 2019 05:26 )  x     / x     / 457 u/L / 8.47 ng/mL / x      CARDIAC MARKERS ( 24 Oct 2019 15:48 )  x     / x     / 314 u/L / 7.94 ng/mL / x      CARDIAC MARKERS ( 24 Oct 2019 12:15 )  x     / x     / 197 u/L / x     / x      CARDIAC MARKERS ( 24 Oct 2019 10:15 )  x     / x     / Test not performed SPECIMEN GROSSLY HEMOLYZED u/L / x     / x                              12.8   6.96  )-----------( 128      ( 25 Oct 2019 05:26 )             40.3     Hb Trend: 12.8<--    10-25    141  |  105  |  12  ----------------------------<  81  4.2   |  25  |  0.69    Ca    9.3      25 Oct 2019 05:26    TPro  7.8  /  Alb  4.4  /  TBili  0.4  /  DBili  x   /  AST  23  /  ALT  10  /  AlkPhos  67  10-24    Creatinine Trend: 0.69<--, 0.66<--, 0.62<--    Coags:      proBNP:   Lipid Profile:   HgA1c: Hemoglobin A1C, Whole Blood: 5.8 % (10-24 @ 15:48)    TSH: Thyroid Stimulating Hormone, Serum: 0.91 uIU/mL (10-24 @ 15:48)        PHYSICAL EXAM:  T(C): 37.1 (10-25-19 @ 14:13), Max: 37.1 (10-25-19 @ 14:13)  HR: 62 (10-25-19 @ 14:13) (55 - 76)  BP: 96/48 (10-25-19 @ 14:13) (96/48 - 126/72)  RR: 18 (10-25-19 @ 14:13) (17 - 18)  SpO2: 100% (10-25-19 @ 14:13) (97% - 100%)  Wt(kg): --  I&O's Summary    24 Oct 2019 07:01  -  25 Oct 2019 07:00  --------------------------------------------------------  IN: 1175 mL / OUT: 1175 mL / NET: 0 mL        Gen: Appears well in NAD  HEENT:  (-)icterus (-)pallor  CV: N S1 S2 1/6 MONICA (+)2 Pulses B/l  Resp:  Clear to auscultation B/L, normal effort  GI: (+) BS Soft, NT, ND  Lymph:  (-)Edema, (-)obvious lymphadenopathy  Skin: Warm to touch, Normal turgor  Psych: Appropriate mood and affect      TELEMETRY: 	 NSR - SB      ECG:  	   NSR , normal IN interval     RADIOLOGY:         CXR:   < from: Xray Chest 1 View- PORTABLE-Urgent (10.24.19 @ 10:51) >    IMPRESSION:  Clear lungs with large hiatal hernia.      STACY GAMBOA M.D., ATTENDING RADIOLOGIST  This document has been electronically signed. Oct 24 2019 11:09AM    < end of copied text >      ASSESSMENT/PLAN: 	    89 y/o  male with PMHx of cataracts, HLD, past MI 24 y/o s/p atherectomy, CAD, varicose veins, h/o hernia x2, h/o fractured ankle 3 mo ago, and h/o BPH, p/w  syncopal episode, admitted to tele for syncope, r/o ACS, hypovolemia dehydration and acute cellulitis LLE.    -- no anginal symptoms, palps or dizziness  -- would check echo eval structural heart  -- indeterminant trop, f/u per cards  -- check orthostatics  -- cont tele, monitor for malignant bradyarrhythmia   -- avoid AV node blockers   -- may need OP event recorder  -- further EP work up to be discussed with attending

## 2019-10-25 NOTE — PHYSICAL THERAPY INITIAL EVALUATION ADULT - DIAGNOSIS, PT EVAL
Deconditioning, decreased strength, decreased balance, decreased endurance all limiting pts. ability to perform functional mobility

## 2019-10-26 LAB
BASOPHILS # BLD AUTO: 0.02 K/UL — SIGNIFICANT CHANGE UP (ref 0–0.2)
BASOPHILS NFR BLD AUTO: 0.3 % — SIGNIFICANT CHANGE UP (ref 0–2)
EOSINOPHIL # BLD AUTO: 0.22 K/UL — SIGNIFICANT CHANGE UP (ref 0–0.5)
EOSINOPHIL NFR BLD AUTO: 2.8 % — SIGNIFICANT CHANGE UP (ref 0–6)
HCT VFR BLD CALC: 40.8 % — SIGNIFICANT CHANGE UP (ref 39–50)
HGB BLD-MCNC: 12.8 G/DL — LOW (ref 13–17)
IMM GRANULOCYTES NFR BLD AUTO: 0.3 % — SIGNIFICANT CHANGE UP (ref 0–1.5)
LYMPHOCYTES # BLD AUTO: 1.76 K/UL — SIGNIFICANT CHANGE UP (ref 1–3.3)
LYMPHOCYTES # BLD AUTO: 22.8 % — SIGNIFICANT CHANGE UP (ref 13–44)
MCHC RBC-ENTMCNC: 31.4 % — LOW (ref 32–36)
MCHC RBC-ENTMCNC: 32.1 PG — SIGNIFICANT CHANGE UP (ref 27–34)
MCV RBC AUTO: 102.3 FL — HIGH (ref 80–100)
MONOCYTES # BLD AUTO: 0.92 K/UL — HIGH (ref 0–0.9)
MONOCYTES NFR BLD AUTO: 11.9 % — SIGNIFICANT CHANGE UP (ref 2–14)
NEUTROPHILS # BLD AUTO: 4.78 K/UL — SIGNIFICANT CHANGE UP (ref 1.8–7.4)
NEUTROPHILS NFR BLD AUTO: 61.9 % — SIGNIFICANT CHANGE UP (ref 43–77)
NRBC # FLD: 0 K/UL — SIGNIFICANT CHANGE UP (ref 0–0)
PLATELET # BLD AUTO: 118 K/UL — LOW (ref 150–400)
PMV BLD: 14.5 FL — HIGH (ref 7–13)
RBC # BLD: 3.99 M/UL — LOW (ref 4.2–5.8)
RBC # FLD: 14.8 % — HIGH (ref 10.3–14.5)
WBC # BLD: 7.72 K/UL — SIGNIFICANT CHANGE UP (ref 3.8–10.5)
WBC # FLD AUTO: 7.72 K/UL — SIGNIFICANT CHANGE UP (ref 3.8–10.5)

## 2019-10-26 PROCEDURE — 93306 TTE W/DOPPLER COMPLETE: CPT | Mod: 26

## 2019-10-26 RX ADMIN — Medication 81 MILLIGRAM(S): at 11:30

## 2019-10-26 RX ADMIN — HEPARIN SODIUM 5000 UNIT(S): 5000 INJECTION INTRAVENOUS; SUBCUTANEOUS at 05:11

## 2019-10-26 RX ADMIN — TAMSULOSIN HYDROCHLORIDE 0.4 MILLIGRAM(S): 0.4 CAPSULE ORAL at 22:42

## 2019-10-26 RX ADMIN — Medication 100 MILLIGRAM(S): at 02:56

## 2019-10-26 RX ADMIN — Medication 100 MILLIGRAM(S): at 11:30

## 2019-10-26 RX ADMIN — Medication 100 MILLIGRAM(S): at 19:48

## 2019-10-26 RX ADMIN — HEPARIN SODIUM 5000 UNIT(S): 5000 INJECTION INTRAVENOUS; SUBCUTANEOUS at 22:42

## 2019-10-27 LAB
ANION GAP SERPL CALC-SCNC: 9 MMO/L — SIGNIFICANT CHANGE UP (ref 7–14)
BASOPHILS # BLD AUTO: 0.01 K/UL — SIGNIFICANT CHANGE UP (ref 0–0.2)
BASOPHILS NFR BLD AUTO: 0.1 % — SIGNIFICANT CHANGE UP (ref 0–2)
BUN SERPL-MCNC: 10 MG/DL — SIGNIFICANT CHANGE UP (ref 7–23)
CALCIUM SERPL-MCNC: 7.6 MG/DL — LOW (ref 8.4–10.5)
CHLORIDE SERPL-SCNC: 114 MMOL/L — HIGH (ref 98–107)
CO2 SERPL-SCNC: 21 MMOL/L — LOW (ref 22–31)
CREAT SERPL-MCNC: 0.46 MG/DL — LOW (ref 0.5–1.3)
EOSINOPHIL # BLD AUTO: 0.24 K/UL — SIGNIFICANT CHANGE UP (ref 0–0.5)
EOSINOPHIL NFR BLD AUTO: 3.3 % — SIGNIFICANT CHANGE UP (ref 0–6)
GLUCOSE SERPL-MCNC: 75 MG/DL — SIGNIFICANT CHANGE UP (ref 70–99)
HCT VFR BLD CALC: 37.8 % — LOW (ref 39–50)
HGB BLD-MCNC: 11.8 G/DL — LOW (ref 13–17)
IMM GRANULOCYTES NFR BLD AUTO: 0.3 % — SIGNIFICANT CHANGE UP (ref 0–1.5)
LYMPHOCYTES # BLD AUTO: 1.33 K/UL — SIGNIFICANT CHANGE UP (ref 1–3.3)
LYMPHOCYTES # BLD AUTO: 18.4 % — SIGNIFICANT CHANGE UP (ref 13–44)
MAGNESIUM SERPL-MCNC: 1.8 MG/DL — SIGNIFICANT CHANGE UP (ref 1.6–2.6)
MCHC RBC-ENTMCNC: 31.2 % — LOW (ref 32–36)
MCHC RBC-ENTMCNC: 32.2 PG — SIGNIFICANT CHANGE UP (ref 27–34)
MCV RBC AUTO: 103 FL — HIGH (ref 80–100)
MONOCYTES # BLD AUTO: 0.77 K/UL — SIGNIFICANT CHANGE UP (ref 0–0.9)
MONOCYTES NFR BLD AUTO: 10.6 % — SIGNIFICANT CHANGE UP (ref 2–14)
NEUTROPHILS # BLD AUTO: 4.87 K/UL — SIGNIFICANT CHANGE UP (ref 1.8–7.4)
NEUTROPHILS NFR BLD AUTO: 67.3 % — SIGNIFICANT CHANGE UP (ref 43–77)
NRBC # FLD: 0 K/UL — SIGNIFICANT CHANGE UP (ref 0–0)
PLATELET # BLD AUTO: 110 K/UL — LOW (ref 150–400)
PMV BLD: SIGNIFICANT CHANGE UP FL (ref 7–13)
POTASSIUM SERPL-MCNC: 3.6 MMOL/L — SIGNIFICANT CHANGE UP (ref 3.5–5.3)
POTASSIUM SERPL-SCNC: 3.6 MMOL/L — SIGNIFICANT CHANGE UP (ref 3.5–5.3)
RBC # BLD: 3.67 M/UL — LOW (ref 4.2–5.8)
RBC # FLD: 14.6 % — HIGH (ref 10.3–14.5)
SODIUM SERPL-SCNC: 144 MMOL/L — SIGNIFICANT CHANGE UP (ref 135–145)
WBC # BLD: 7.24 K/UL — SIGNIFICANT CHANGE UP (ref 3.8–10.5)
WBC # FLD AUTO: 7.24 K/UL — SIGNIFICANT CHANGE UP (ref 3.8–10.5)

## 2019-10-27 RX ORDER — ISOSORBIDE MONONITRATE 60 MG/1
30 TABLET, EXTENDED RELEASE ORAL DAILY
Refills: 0 | Status: DISCONTINUED | OUTPATIENT
Start: 2019-10-27 | End: 2019-10-27

## 2019-10-27 RX ORDER — ATORVASTATIN CALCIUM 80 MG/1
20 TABLET, FILM COATED ORAL AT BEDTIME
Refills: 0 | Status: DISCONTINUED | OUTPATIENT
Start: 2019-10-27 | End: 2019-10-29

## 2019-10-27 RX ADMIN — SODIUM CHLORIDE 75 MILLILITER(S): 9 INJECTION INTRAMUSCULAR; INTRAVENOUS; SUBCUTANEOUS at 11:27

## 2019-10-27 RX ADMIN — Medication 81 MILLIGRAM(S): at 11:28

## 2019-10-27 RX ADMIN — Medication 100 MILLIGRAM(S): at 03:48

## 2019-10-27 RX ADMIN — TAMSULOSIN HYDROCHLORIDE 0.4 MILLIGRAM(S): 0.4 CAPSULE ORAL at 21:23

## 2019-10-27 RX ADMIN — HEPARIN SODIUM 5000 UNIT(S): 5000 INJECTION INTRAVENOUS; SUBCUTANEOUS at 21:23

## 2019-10-27 RX ADMIN — Medication 100 MILLIGRAM(S): at 11:28

## 2019-10-27 RX ADMIN — HEPARIN SODIUM 5000 UNIT(S): 5000 INJECTION INTRAVENOUS; SUBCUTANEOUS at 05:54

## 2019-10-27 RX ADMIN — Medication 100 MILLIGRAM(S): at 19:31

## 2019-10-27 RX ADMIN — HEPARIN SODIUM 5000 UNIT(S): 5000 INJECTION INTRAVENOUS; SUBCUTANEOUS at 13:36

## 2019-10-27 RX ADMIN — ATORVASTATIN CALCIUM 20 MILLIGRAM(S): 80 TABLET, FILM COATED ORAL at 21:23

## 2019-10-28 PROCEDURE — 93018 CV STRESS TEST I&R ONLY: CPT | Mod: GC

## 2019-10-28 PROCEDURE — 78452 HT MUSCLE IMAGE SPECT MULT: CPT | Mod: 26

## 2019-10-28 PROCEDURE — 93016 CV STRESS TEST SUPVJ ONLY: CPT | Mod: GC

## 2019-10-28 RX ADMIN — Medication 100 MILLIGRAM(S): at 21:58

## 2019-10-28 RX ADMIN — TAMSULOSIN HYDROCHLORIDE 0.4 MILLIGRAM(S): 0.4 CAPSULE ORAL at 21:56

## 2019-10-28 RX ADMIN — HEPARIN SODIUM 5000 UNIT(S): 5000 INJECTION INTRAVENOUS; SUBCUTANEOUS at 13:05

## 2019-10-28 RX ADMIN — Medication 100 MILLIGRAM(S): at 04:11

## 2019-10-28 RX ADMIN — SODIUM CHLORIDE 75 MILLILITER(S): 9 INJECTION INTRAMUSCULAR; INTRAVENOUS; SUBCUTANEOUS at 00:53

## 2019-10-28 RX ADMIN — HEPARIN SODIUM 5000 UNIT(S): 5000 INJECTION INTRAVENOUS; SUBCUTANEOUS at 21:56

## 2019-10-28 RX ADMIN — ATORVASTATIN CALCIUM 20 MILLIGRAM(S): 80 TABLET, FILM COATED ORAL at 21:56

## 2019-10-28 RX ADMIN — Medication 81 MILLIGRAM(S): at 13:06

## 2019-10-28 RX ADMIN — HEPARIN SODIUM 5000 UNIT(S): 5000 INJECTION INTRAVENOUS; SUBCUTANEOUS at 05:18

## 2019-10-28 RX ADMIN — Medication 100 MILLIGRAM(S): at 13:05

## 2019-10-28 RX ADMIN — SODIUM CHLORIDE 75 MILLILITER(S): 9 INJECTION INTRAMUSCULAR; INTRAVENOUS; SUBCUTANEOUS at 13:06

## 2019-10-28 NOTE — PROVIDER CONTACT NOTE (OTHER) - ASSESSMENT
Pt refusing compliance with lab testing schedule. Pt educated on need for testing. Pt continues to refuse.

## 2019-10-28 NOTE — PROVIDER CONTACT NOTE (OTHER) - BACKGROUND
Pt admitted with syncope and collapse. LLE cellulitus. PMH BPH, varicose veins, hernia repair, cataracts, coronary artery disease, hyperlipidemia, MI, ankle fracture.

## 2019-10-29 VITALS
RESPIRATION RATE: 17 BRPM | OXYGEN SATURATION: 99 % | SYSTOLIC BLOOD PRESSURE: 122 MMHG | TEMPERATURE: 98 F | HEART RATE: 85 BPM | DIASTOLIC BLOOD PRESSURE: 56 MMHG

## 2019-10-29 RX ORDER — ATORVASTATIN CALCIUM 80 MG/1
1 TABLET, FILM COATED ORAL
Qty: 30 | Refills: 0
Start: 2019-10-29 | End: 2019-11-27

## 2019-10-29 RX ORDER — TAMSULOSIN HYDROCHLORIDE 0.4 MG/1
1 CAPSULE ORAL
Qty: 30 | Refills: 0
Start: 2019-10-29 | End: 2019-11-27

## 2019-10-29 RX ORDER — ASPIRIN/CALCIUM CARB/MAGNESIUM 324 MG
1 TABLET ORAL
Qty: 30 | Refills: 0
Start: 2019-10-29 | End: 2019-11-27

## 2019-10-29 RX ORDER — CEPHALEXIN 500 MG
1 CAPSULE ORAL
Qty: 8 | Refills: 0
Start: 2019-10-29 | End: 2019-10-30

## 2019-10-29 RX ADMIN — HEPARIN SODIUM 5000 UNIT(S): 5000 INJECTION INTRAVENOUS; SUBCUTANEOUS at 12:14

## 2019-10-29 RX ADMIN — SODIUM CHLORIDE 75 MILLILITER(S): 9 INJECTION INTRAMUSCULAR; INTRAVENOUS; SUBCUTANEOUS at 09:32

## 2019-10-29 RX ADMIN — Medication 81 MILLIGRAM(S): at 12:14

## 2019-10-29 RX ADMIN — Medication 100 MILLIGRAM(S): at 04:51

## 2019-10-29 RX ADMIN — SODIUM CHLORIDE 75 MILLILITER(S): 9 INJECTION INTRAMUSCULAR; INTRAVENOUS; SUBCUTANEOUS at 04:51

## 2019-10-29 RX ADMIN — Medication 100 MILLIGRAM(S): at 12:14

## 2019-10-29 RX ADMIN — HEPARIN SODIUM 5000 UNIT(S): 5000 INJECTION INTRAVENOUS; SUBCUTANEOUS at 05:00

## 2019-10-29 NOTE — PROGRESS NOTE ADULT - PROBLEM SELECTOR PROBLEM 3
Urinary retention with incomplete bladder emptying

## 2019-10-29 NOTE — PROGRESS NOTE ADULT - PROBLEM SELECTOR PROBLEM 5
BPH with obstruction/lower urinary tract symptoms

## 2019-10-29 NOTE — DISCHARGE NOTE PROVIDER - PROVIDER TOKENS
PROVIDER:[TOKEN:[[id number]]] PROVIDER:[TOKEN:[[id number]]],PROVIDER:[[id number]]]

## 2019-10-29 NOTE — PROGRESS NOTE ADULT - REASON FOR ADMISSION
possible syncope

## 2019-10-29 NOTE — PROGRESS NOTE ADULT - PROBLEM SELECTOR PLAN 4
IV Cefazolin 1gram TID  ID eval called for further recs
IV Cefazolin 1gram TID  ID F/U appreciated
IV Cefazolin 1gram TID  ID eval called for further recs

## 2019-10-29 NOTE — DISCHARGE NOTE PROVIDER - NSDCCPCAREPLAN_GEN_ALL_CORE_FT
PRINCIPAL DISCHARGE DIAGNOSIS  Diagnosis: Syncope  Assessment and Plan of Treatment: Low salt diet, fluid restriction to 1500 ml daily, monitor your fluid intake and weight daily, exercise as tolerated 30 minutes daily, and follow up with Dr. Purvis within 1 week of discharge         SECONDARY DISCHARGE DIAGNOSES  Diagnosis: Cellulitis of left lower extremity without foot  Assessment and Plan of Treatment: Continue Keflex 500q6 until 10/31. Follow up with Dr. Padilla outpatient within 1 week of discharge

## 2019-10-29 NOTE — PROGRESS NOTE ADULT - PROBLEM SELECTOR PLAN 2
improved after hydration  doing well  labs noted, monitor electrolytes

## 2019-10-29 NOTE — PROGRESS NOTE ADULT - PROBLEM SELECTOR PLAN 7
Heparin 5000 units SC Q8h  PT

## 2019-10-29 NOTE — PROGRESS NOTE ADULT - SUBJECTIVE AND OBJECTIVE BOX
[facility]    Subjective: Patient seen and examined. No new events except as noted.   doing well  D/C planing     REVIEW OF SYSTEMS:    CONSTITUTIONAL: No weakness, fevers or chills  EYES/ENT: No visual changes;  No vertigo or throat pain   NECK: No pain or stiffness  RESPIRATORY: No cough, wheezing, hemoptysis; No shortness of breath  CARDIOVASCULAR: No chest pain or palpitations  GASTROINTESTINAL: No abdominal or epigastric pain. No nausea, vomiting, or hematemesis; No diarrhea or constipation. No melena or hematochezia.  GENITOURINARY: No dysuria, frequency or hematuria  NEUROLOGICAL: No numbness or weakness  SKIN: No itching, burning, rashes, or lesions   All other review of systems is negative unless indicated above.    MEDICATIONS:  MEDICATIONS  (STANDING):  aspirin enteric coated 81 milliGRAM(s) Oral daily  atorvastatin 20 milliGRAM(s) Oral at bedtime  ceFAZolin   IVPB 1000 milliGRAM(s) IV Intermittent every 8 hours  heparin  Injectable 5000 Unit(s) SubCutaneous every 8 hours  influenza   Vaccine 0.5 milliLiter(s) IntraMuscular once  sodium chloride 0.9%. 1000 milliLiter(s) (75 mL/Hr) IV Continuous <Continuous>  tamsulosin 0.4 milliGRAM(s) Oral at bedtime      PHYSICAL EXAM:  T(C): 36.5 (10-29-19 @ 12:35), Max: 36.7 (10-28-19 @ 21:44)  HR: 85 (10-29-19 @ 12:35) (64 - 85)  BP: 122/56 (10-29-19 @ 12:35) (122/56 - 140/72)  RR: 17 (10-29-19 @ 12:35) (17 - 17)  SpO2: 99% (10-29-19 @ 12:35) (99% - 100%)  Wt(kg): --  I&O's Summary    28 Oct 2019 07:01  -  29 Oct 2019 07:00  --------------------------------------------------------  IN: 0 mL / OUT: 200 mL / NET: -200 mL          Appearance: Normal	  HEENT:   Normal oral mucosa, PERRL, EOMI	  Lymphatic: No lymphadenopathy , no edema  Cardiovascular: Normal S1 S2, No JVD, No murmurs , Peripheral pulses palpable 2+ bilaterally  Respiratory: Lungs clear to auscultation, normal effort 	  Gastrointestinal:  Soft, Non-tender, + BS	  Skin: No rashes, No ecchymoses, No cyanosis, warm to touch  Musculoskeletal: Normal range of motion, normal strength  Psychiatry:  Mood & affect appropriate  Ext: No edema      All labs, Imaging and EKGs personally reviewed       < from: Nuclear Stress Test-Pharmacologic (10.28.19 @ 09:39) >  IMPRESSIONS:Abnormal Study  * Myocardial Perfusion SPECT results are abnormal.  * Review of raw data shows: The study is of good overall  technical quality, despite bowel tracer uptake  * The left ventricle was normal in size. There are small,  severe defects in proximal to mid  inferior and  inferolateral walls are predominantly fixed, suggestive of  infarct with minimal dafne-infarct ischemia  * Post-stress gated wall motion analysis was performed  (LVEF = 55 %;LVEDV = 69 ml.), revealing normal overall LV  function, despite segmental wall motion abnormality. There  was absence of proximal to mid inferolateral wall systolic  thickening (though with preserved wall motion). RV  function appeared normal.
[facility]    Subjective: Patient seen and examined. No new events except as noted.   doing well     REVIEW OF SYSTEMS:    CONSTITUTIONAL: No weakness, fevers or chills  EYES/ENT: No visual changes;  No vertigo or throat pain   NECK: No pain or stiffness  RESPIRATORY: No cough, wheezing, hemoptysis; No shortness of breath  CARDIOVASCULAR: No chest pain or palpitations  GASTROINTESTINAL: No abdominal or epigastric pain.  GENITOURINARY: No dysuria, frequency or hematuria  NEUROLOGICAL: No numbness or weakness  SKIN: No itching, burning, rashes, or lesions   All other review of systems is negative unless indicated above.    MEDICATIONS:  MEDICATIONS  (STANDING):  aspirin enteric coated 81 milliGRAM(s) Oral daily  ceFAZolin   IVPB 1000 milliGRAM(s) IV Intermittent every 8 hours  heparin  Injectable 5000 Unit(s) SubCutaneous every 8 hours  influenza   Vaccine 0.5 milliLiter(s) IntraMuscular once  sodium chloride 0.9%. 1000 milliLiter(s) (75 mL/Hr) IV Continuous <Continuous>  tamsulosin 0.4 milliGRAM(s) Oral at bedtime      PHYSICAL EXAM:  T(C): 37.1 (10-25-19 @ 14:13), Max: 37.1 (10-25-19 @ 14:13)  HR: 62 (10-25-19 @ 14:13) (55 - 76)  BP: 96/48 (10-25-19 @ 14:13) (96/48 - 126/72)  RR: 18 (10-25-19 @ 14:13) (17 - 18)  SpO2: 100% (10-25-19 @ 14:13) (97% - 100%)  Wt(kg): --  I&O's Summary    24 Oct 2019 07:01  -  25 Oct 2019 07:00  --------------------------------------------------------  IN: 1175 mL / OUT: 1175 mL / NET: 0 mL      Height (cm): 167.64 (10-24 @ 16:14)  Weight (kg): 49.9 (10-24 @ 16:14)  BMI (kg/m2): 17.8 (10-24 @ 16:14)  BSA (m2): 1.55 (10-24 @ 16:14)    Appearance: Normal	  HEENT:   Normal oral mucosa, PERRL, EOMI	  Lymphatic: No lymphadenopathy , no edema  Cardiovascular: Normal S1 S2, No JVD  Respiratory: Lungs clear to auscultation, normal effort 	  Gastrointestinal:  Soft, Non-tender, + BS	  Skin: LE erythema feet   Musculoskeletal: Normal range of motion, normal strength  Psychiatry:  Mood & affect appropriate  Ext: No edema      All labs, Imaging and EKGs personally reviewed                           12.8   6.96  )-----------( 128      ( 25 Oct 2019 05:26 )             40.3               10-25    141  |  105  |  12  ----------------------------<  81  4.2   |  25  |  0.69    Ca    9.3      25 Oct 2019 05:26    TPro  7.8  /  Alb  4.4  /  TBili  0.4  /  DBili  x   /  AST  23  /  ALT  10  /  AlkPhos  67  10-24    PT/INR - ( 24 Oct 2019 10:15 )   PT: 10.7 SEC;   INR: 0.97          PTT - ( 24 Oct 2019 10:15 )  PTT:20.4 SEC       CARDIAC MARKERS ( 25 Oct 2019 05:26 )  x     / x     / 457 u/L / 8.47 ng/mL / x      CARDIAC MARKERS ( 24 Oct 2019 15:48 )  x     / x     / 314 u/L / 7.94 ng/mL / x      CARDIAC MARKERS ( 24 Oct 2019 12:15 )  x     / x     / 197 u/L / x     / x      CARDIAC MARKERS ( 24 Oct 2019 10:15 )  x     / x     / Test not performed SPECIMEN GROSSLY HEMOLYZED u/L / x     / x                  Urinalysis Basic - ( 24 Oct 2019 12:12 )    Color: LIGHT YELLOW / Appearance: CLEAR / S.013 / pH: 7.5  Gluc: NEGATIVE / Ketone: NEGATIVE  / Bili: NEGATIVE / Urobili: NORMAL   Blood: NEGATIVE / Protein: 20 / Nitrite: NEGATIVE   Leuk Esterase: NEGATIVE / RBC: 0-2 / WBC 0-2   Sq Epi: OCC / Non Sq Epi: x / Bacteria: NEGATIVE
EP    tele: NSR, no significant events, nocturnal sinus bradycardia    no palpitations, no syncope, no angina, ROS otherwise -    MEDICATIONS  (STANDING):  aspirin enteric coated 81 milliGRAM(s) Oral daily  atorvastatin 20 milliGRAM(s) Oral at bedtime  ceFAZolin   IVPB 1000 milliGRAM(s) IV Intermittent every 8 hours  heparin  Injectable 5000 Unit(s) SubCutaneous every 8 hours  influenza   Vaccine 0.5 milliLiter(s) IntraMuscular once  sodium chloride 0.9%. 1000 milliLiter(s) (75 mL/Hr) IV Continuous <Continuous>  tamsulosin 0.4 milliGRAM(s) Oral at bedtime      LABS:                      11.8   7.24  )-----------( 110      ( 27 Oct 2019 05:10 )             37.8     144  |  114<H>  |  10  ----------------------------<  75  3.6   |  21<L>  |  0.46<L>    Ca    7.6<L>      27 Oct 2019 05:10  Mg     1.8     10-27    PHYSICAL EXAM  Vital Signs Last 24 Hrs  T(C): 36.6 (28 Oct 2019 12:44), Max: 36.7 (27 Oct 2019 21:12)  T(F): 97.8 (28 Oct 2019 12:44), Max: 98 (27 Oct 2019 21:12)  HR: 89 (28 Oct 2019 13:04) (63 - 93)  BP: 109/66 (28 Oct 2019 13:04) (103/56 - 156/76)  RR: 17 (28 Oct 2019 12:44) (17 - 19)  SpO2: 98% (28 Oct 2019 12:44) (97% - 99%)      no JVD  RRR, no murmurs  CTAB  soft nt/nd  no c/c/e  A&O x 3  neurologically intact    echo LVEF 45% with regional AW motion abnormality  NST pending      A/P) He is a pleasant 89 y/o male PMH hyperlipidemia, CAD s/p atherectomy (1990s), BPH admitted with possible syncope in setting of cellulitis. EP is called for periods of sinus bradycardia on tele - his baseline QRS is narrow, and there have been no malignant events on tele    -f/u NST   -no obvious indication for PPM at this time  -recommend outpatient event monitoring  -given his advanced age an EP study screening for VT given his LVEF 45% would unlikely benefit him long term
EP ATTENDING    tele: NSR, no significant events, nocturnal sinus bradycardia    no palpitations, no syncope, no angina, ROS otherwise -    aspirin enteric coated 81 milliGRAM(s) Oral daily  atorvastatin 20 milliGRAM(s) Oral at bedtime  ceFAZolin   IVPB 1000 milliGRAM(s) IV Intermittent every 8 hours  heparin  Injectable 5000 Unit(s) SubCutaneous every 8 hours  influenza   Vaccine 0.5 milliLiter(s) IntraMuscular once  sodium chloride 0.9%. 1000 milliLiter(s) IV Continuous <Continuous>  tamsulosin 0.4 milliGRAM(s) Oral at bedtime                            11.8   7.24  )-----------( 110      ( 27 Oct 2019 05:10 )             37.8       10-27    144  |  114<H>  |  10  ----------------------------<  75  3.6   |  21<L>  |  0.46<L>    Ca    7.6<L>      27 Oct 2019 05:10  Mg     1.8     10-27        CARDIAC MARKERS ( 25 Oct 2019 05:26 )  x     / x     / 457 u/L / 8.47 ng/mL / x      CARDIAC MARKERS ( 24 Oct 2019 15:48 )  x     / x     / 314 u/L / 7.94 ng/mL / x            T(C): 36.6 (10-27-19 @ 14:05), Max: 36.6 (10-26-19 @ 23:25)  HR: 68 (10-27-19 @ 14:05) (63 - 68)  BP: 128/52 (10-27-19 @ 14:05) (128/52 - 154/64)  RR: 19 (10-27-19 @ 14:05) (18 - 19)  SpO2: 99% (10-27-19 @ 14:05) (98% - 100%)  Wt(kg): --    no JVD  RRR, no murmurs  CTAB  soft nt/nd  no c/c/e  A&O x 3  neurologically intact    echo LVEF 45% with regional AW motion abnormality  NST pending      A/P) He is a pleasant 89 y/o male PMH hyperlipidemia, CAD s/p atherectomy (1990s), BPH admitted with possible syncope in setting of cellulitis. EP is called for periods of sinus bradycardia on tele - his baseline QRS is narrow, and there have been no malignant events on tele    -f/u NST Monday  -no obvious indication for PPM at this time  -recommend outpatient event monitoring  -given his advanced age an EP study screening for VT given his LVEF 45% would unlikely benefit him long term
EP ATTENDING    tele: NSR, no significant events, nocturnal sinus bradycardia    no palpitations, no syncope, no angina, ROS otherwise -    aspirin enteric coated 81 milliGRAM(s) Oral daily  ceFAZolin   IVPB 1000 milliGRAM(s) IV Intermittent every 8 hours  heparin  Injectable 5000 Unit(s) SubCutaneous every 8 hours  influenza   Vaccine 0.5 milliLiter(s) IntraMuscular once  sodium chloride 0.9%. 1000 milliLiter(s) IV Continuous <Continuous>  tamsulosin 0.4 milliGRAM(s) Oral at bedtime                            12.8   7.72  )-----------( 118      ( 26 Oct 2019 05:02 )             40.8       10-25    141  |  105  |  12  ----------------------------<  81  4.2   |  25  |  0.69    Ca    9.3      25 Oct 2019 05:26        CARDIAC MARKERS ( 25 Oct 2019 05:26 )  x     / x     / 457 u/L / 8.47 ng/mL / x      CARDIAC MARKERS ( 24 Oct 2019 15:48 )  x     / x     / 314 u/L / 7.94 ng/mL / x      CARDIAC MARKERS ( 24 Oct 2019 12:15 )  x     / x     / 197 u/L / x     / x      CARDIAC MARKERS ( 24 Oct 2019 10:15 )  x     / x     / Test not performed SPECIMEN GROSSLY HEMOLYZED u/L / x     / x            T(C): 36.4 (10-26-19 @ 12:12), Max: 37.1 (10-25-19 @ 14:13)  HR: 81 (10-26-19 @ 12:12) (56 - 81)  BP: 128/59 (10-26-19 @ 12:12) (96/48 - 128/59)  RR: 18 (10-26-19 @ 12:12) (18 - 18)  SpO2: 100% (10-26-19 @ 12:12) (98% - 100%)  Wt(kg): --    no JVD  RRR, no murmurs  CTAB  soft nt/nd  no c/c/e  A&O x 3  neurologically intact    echo pending    A/P) He is a pleasant 89 y/o male PMH hyperlipidemia, CAD s/p atherectomy (1990s), BPH admitted with possible syncope in setting of cellulitis. EP is called for periods of sinus bradycardia on tele - his baseline QRS is narrow, and there have been no malignant events on tele    -check echo r/o structural heart disease  -if echo unremarkable would recommend outpatient event monitoring  -no obvious indication for PPM at this time
[facility]    Subjective: Patient seen and examined. No new events except as noted.   doing well   stress test today     REVIEW OF SYSTEMS:    CONSTITUTIONAL: No weakness, fevers or chills  EYES/ENT: No visual changes;  No vertigo or throat pain   NECK: No pain or stiffness  RESPIRATORY: No cough, wheezing, hemoptysis; No shortness of breath  CARDIOVASCULAR: No chest pain or palpitations  GASTROINTESTINAL: No abdominal or epigastric pain.   GENITOURINARY: No dysuria, frequency or hematuria  NEUROLOGICAL: No numbness or weakness  SKIN: No itching, burning, rashes, or lesions   All other review of systems is negative unless indicated above.    MEDICATIONS:  MEDICATIONS  (STANDING):  aspirin enteric coated 81 milliGRAM(s) Oral daily  atorvastatin 20 milliGRAM(s) Oral at bedtime  ceFAZolin   IVPB 1000 milliGRAM(s) IV Intermittent every 8 hours  heparin  Injectable 5000 Unit(s) SubCutaneous every 8 hours  influenza   Vaccine 0.5 milliLiter(s) IntraMuscular once  sodium chloride 0.9%. 1000 milliLiter(s) (75 mL/Hr) IV Continuous <Continuous>  tamsulosin 0.4 milliGRAM(s) Oral at bedtime      PHYSICAL EXAM:  T(C): 36.6 (10-28-19 @ 12:44), Max: 36.7 (10-27-19 @ 21:12)  HR: 89 (10-28-19 @ 13:04) (63 - 93)  BP: 109/66 (10-28-19 @ 13:04) (103/56 - 156/76)  RR: 17 (10-28-19 @ 12:44) (17 - 18)  SpO2: 98% (10-28-19 @ 12:44) (97% - 98%)  Wt(kg): --  I&O's Summary    27 Oct 2019 07:01  -  28 Oct 2019 07:00  --------------------------------------------------------  IN: 2545 mL / OUT: 2500 mL / NET: 45 mL    28 Oct 2019 07:01  -  28 Oct 2019 14:16  --------------------------------------------------------  IN: 0 mL / OUT: 200 mL / NET: -200 mL          Appearance: Normal	  HEENT:   Normal oral mucosa, PERRL, EOMI	  Lymphatic: No lymphadenopathy , no edema  Cardiovascular: Normal S1 S2, No JVD, No murmurs , Peripheral pulses palpable 2+ bilaterally  Respiratory: Lungs clear to auscultation, normal effort 	  Gastrointestinal:  Soft, Non-tender, + BS	  Skin: No rashes, No ecchymoses, No cyanosis, warm to touch  Musculoskeletal: Normal range of motion, normal strength  Psychiatry:  Mood & affect appropriate  Ext: No edema      All labs, Imaging and EKGs personally reviewed                             11.8   7.24  )-----------( 110      ( 27 Oct 2019 05:10 )             37.8               10-27    144  |  114<H>  |  10  ----------------------------<  75  3.6   |  21<L>  |  0.46<L>    Ca    7.6<L>      27 Oct 2019 05:10  Mg     1.8     10-27
Infectious Diseases progress note:    Subjective: Coverage appreciated.  LLE resolving erythema.  No fever, leukocytosis, leg pain.  No new somatic complaints.     ROS:  CONSTITUTIONAL:  No fever, chills, rigors  CARDIOVASCULAR:  No chest pain or palpitations  RESPIRATORY:   No SOB, cough, dyspnea on exertion.  No wheezing  GASTROINTESTINAL:  No abd pain, N/V, diarrhea/constipation  EXTREMITIES:  No swelling or joint pain  GENITOURINARY:  No burning on urination, increased frequency or urgency.  No flank pain  NEUROLOGIC:  No HA, visual disturbances  SKIN: No rashes    Allergies    No Known Allergies    Intolerances        ANTIBIOTICS/RELEVANT:  antimicrobials  ceFAZolin   IVPB 1000 milliGRAM(s) IV Intermittent every 8 hours    immunologic:  influenza   Vaccine 0.5 milliLiter(s) IntraMuscular once    OTHER:  aspirin enteric coated 81 milliGRAM(s) Oral daily  atorvastatin 20 milliGRAM(s) Oral at bedtime  heparin  Injectable 5000 Unit(s) SubCutaneous every 8 hours  sodium chloride 0.9%. 1000 milliLiter(s) IV Continuous <Continuous>  tamsulosin 0.4 milliGRAM(s) Oral at bedtime      Objective:  Vital Signs Last 24 Hrs  T(C): 36.6 (28 Oct 2019 12:44), Max: 36.7 (27 Oct 2019 21:12)  T(F): 97.8 (28 Oct 2019 12:44), Max: 98 (27 Oct 2019 21:12)  HR: 89 (28 Oct 2019 13:04) (63 - 93)  BP: 109/66 (28 Oct 2019 13:04) (103/56 - 156/76)  BP(mean): --  RR: 17 (28 Oct 2019 12:44) (17 - 18)  SpO2: 98% (28 Oct 2019 12:44) (97% - 98%)    PHYSICAL EXAM:  Constitutional:NAD  Eyes:ABDOUL, EOMI  Ear/Nose/Throat: no thrush, mucositis.  Moist mucous membranes	  Neck:no JVD, no lymphadenopathy, supple  Respiratory: CTA rory  Cardiovascular: S1S2 RRR, no murmurs  Gastrointestinal:soft, nontender,  nondistended (+) BS  Extremities:no e/e/c  Skin:  LLE resolving erythema.  B/L LE venous stasis hyperpigmentation        LABS:                        11.8   7.24  )-----------( 110      ( 27 Oct 2019 05:10 )             37.8     10-27    144  |  114<H>  |  10  ----------------------------<  75  3.6   |  21<L>  |  0.46<L>    Ca    7.6<L>      27 Oct 2019 05:10  Mg     1.8     10-27                              MICROBIOLOGY:    Culture - Urine (10.24.19 @ 12:48)    Culture - Urine:   NO GROWTH AT 24 HOURS    Specimen Source: URINE MIDSTREAM          RADIOLOGY & ADDITIONAL STUDIES:
Infectious Diseases progress note:    Subjective: NAD, afebrile.  No new complaints.  Denies leg pain.  Erythema resolving.  Planned for D/c today    ROS:  CONSTITUTIONAL:  No fever, chills, rigors  CARDIOVASCULAR:  No chest pain or palpitations  RESPIRATORY:   No SOB, cough, dyspnea on exertion.  No wheezing  GASTROINTESTINAL:  No abd pain, N/V, diarrhea/constipation  EXTREMITIES:  No swelling or joint pain  GENITOURINARY:  No burning on urination, increased frequency or urgency.  No flank pain  NEUROLOGIC:  No HA, visual disturbances  SKIN: No rashes    Allergies    No Known Allergies    Intolerances        ANTIBIOTICS/RELEVANT:  antimicrobials  ceFAZolin   IVPB 1000 milliGRAM(s) IV Intermittent every 8 hours    immunologic:  influenza   Vaccine 0.5 milliLiter(s) IntraMuscular once    OTHER:  aspirin enteric coated 81 milliGRAM(s) Oral daily  atorvastatin 20 milliGRAM(s) Oral at bedtime  heparin  Injectable 5000 Unit(s) SubCutaneous every 8 hours  sodium chloride 0.9%. 1000 milliLiter(s) IV Continuous <Continuous>  tamsulosin 0.4 milliGRAM(s) Oral at bedtime      Objective:  Vital Signs Last 24 Hrs  T(C): 36.5 (29 Oct 2019 12:35), Max: 36.7 (28 Oct 2019 21:44)  T(F): 97.7 (29 Oct 2019 12:35), Max: 98 (28 Oct 2019 21:44)  HR: 85 (29 Oct 2019 12:35) (64 - 85)  BP: 122/56 (29 Oct 2019 12:35) (122/56 - 140/72)  BP(mean): --  RR: 17 (29 Oct 2019 12:35) (17 - 17)  SpO2: 99% (29 Oct 2019 12:35) (99% - 100%)    PHYSICAL EXAM:  Constitutional:NAD  Eyes:ABDOUL, EOMI  Ear/Nose/Throat: no thrush, mucositis.  Moist mucous membranes	  Neck:no JVD, no lymphadenopathy, supple  Respiratory: CTA rory  Cardiovascular: S1S2 RRR, no murmurs  Gastrointestinal:soft, nontender,  nondistended (+) BS  Extremities:no e/e/c  Skin: Erythema resolving over LLE, no open wounds, no fluctuance.        LABS:          MICROBIOLOGY:    Culture - Urine (10.24.19 @ 12:48)    Culture - Urine:   NO GROWTH AT 24 HOURS    Specimen Source: URINE MIDSTREAM          RADIOLOGY & ADDITIONAL STUDIES:    < from: CT Cervical Spine No Cont (10.24.19 @ 11:41) >  IMPRESSION:    1. On the headCT, there is no evidence for calvarial fracture or acute   intracranial hemorrhage. If symptoms persist, consider short interval   follow-up head CT or brain MRI follow-up if there are no MRI   contraindications.  2. On the cervical spine CT, there is no evidence for acute cervical   spine fracture. Degenerative changes as described. If symptoms persist,   consider cervical spine MRI follow-up if there are no MRI   contraindications.    < end of copied text >
Infectious Diseases progress note:    Subjective: Patient seen and examined at bedside, no acute distress noted, s/p echo. Left leg cellulitis improving, remains on Ancef    ROS:  CONSTITUTIONAL:  No fever, chills, rigors  CARDIOVASCULAR:  No chest pain or palpitations  RESPIRATORY:   No SOB, cough, dyspnea on exertion.  No wheezing  GASTROINTESTINAL:  No abd pain, N/V, diarrhea/constipation  EXTREMITIES:  No swelling or joint pain  GENITOURINARY:  No burning on urination, increased frequency or urgency.  No flank pain  NEUROLOGIC:  No HA, visual disturbances  SKIN: left leg redness mild, no swelling    Allergies    No Known Allergies    Intolerances        ANTIBIOTICS/RELEVANT:  antimicrobials  ceFAZolin   IVPB 1000 milliGRAM(s) IV Intermittent every 8 hours    immunologic:  influenza   Vaccine 0.5 milliLiter(s) IntraMuscular once    OTHER:  aspirin enteric coated 81 milliGRAM(s) Oral daily  heparin  Injectable 5000 Unit(s) SubCutaneous every 8 hours  sodium chloride 0.9%. 1000 milliLiter(s) IV Continuous <Continuous>  tamsulosin 0.4 milliGRAM(s) Oral at bedtime      Objective:  Vital Signs Last 24 Hrs  T(C): 36.7 (26 Oct 2019 05:09), Max: 37.1 (25 Oct 2019 14:13)  T(F): 98 (26 Oct 2019 05:09), Max: 98.7 (25 Oct 2019 14:13)  HR: 59 (26 Oct 2019 05:09) (55 - 77)  BP: 116/53 (26 Oct 2019 05:09) (96/48 - 116/53)  BP(mean): --  RR: 18 (26 Oct 2019 05:09) (18 - 18)  SpO2: 100% (26 Oct 2019 05:09) (98% - 100%)    PHYSICAL EXAM:  Constitutional:NAD  Eyes:ABDOUL, EOMI  Ear/Nose/Throat: no thrush, mucositis.  Moist mucous membranes	  Neck:no JVD, no lymphadenopathy, supple  Respiratory: CTA rory  Cardiovascular: S1S2 RRR, no murmurs  Gastrointestinal:soft, nontender,  nondistended (+) BS  Extremities:no e/e/c  Skin:  left leg mildly red, no swelling        LABS:                        12.8   7.72  )-----------( 118      ( 26 Oct 2019 05:02 )             40.8     10-25    141  |  105  |  12  ----------------------------<  81  4.2   |  25  |  0.69    Ca    9.3      25 Oct 2019 05:26    TPro  7.8  /  Alb  4.4  /  TBili  0.4  /  DBili  x   /  AST  23  /  ALT  10  /  AlkPhos  67  10-24      Urinalysis Basic - ( 24 Oct 2019 12:12 )    Color: LIGHT YELLOW / Appearance: CLEAR / S.013 / pH: 7.5  Gluc: NEGATIVE / Ketone: NEGATIVE  / Bili: NEGATIVE / Urobili: NORMAL   Blood: NEGATIVE / Protein: 20 / Nitrite: NEGATIVE   Leuk Esterase: NEGATIVE / RBC: 0-2 / WBC 0-2   Sq Epi: OCC / Non Sq Epi: x / Bacteria: NEGATIVE                          MICROBIOLOGY:    Culture - Urine (10.24.19 @ 12:48)    Culture - Urine:   NO GROWTH AT 24 HOURS    Specimen Source: URINE MIDSTREAM          RADIOLOGY & ADDITIONAL STUDIES:    < from: CT Cervical Spine No Cont (10.24.19 @ 11:41) >  1. On the headCT, there is no evidence for calvarial fracture or acute   intracranial hemorrhage.     2. On the cervical spine CT, there is no evidence for acute cervical   spine fracture. Degenerative changes
Infectious Diseases progress note:    Subjective: Patient seen and examined at bedside, no acute distress noted. Left leg cellulitis much improved    ROS:  CONSTITUTIONAL:  No fever, chills, rigors  CARDIOVASCULAR:  No chest pain or palpitations  RESPIRATORY:   No SOB, cough, dyspnea on exertion.  No wheezing  GASTROINTESTINAL:  No abd pain, N/V, diarrhea/constipation  EXTREMITIES:  No swelling or joint pain  GENITOURINARY:  No burning on urination, increased frequency or urgency.  No flank pain  NEUROLOGIC:  No HA, visual disturbances  SKIN: No rashes    Allergies    No Known Allergies    Intolerances        ANTIBIOTICS/RELEVANT:  antimicrobials  ceFAZolin   IVPB 1000 milliGRAM(s) IV Intermittent every 8 hours    immunologic:  influenza   Vaccine 0.5 milliLiter(s) IntraMuscular once    OTHER:  aspirin enteric coated 81 milliGRAM(s) Oral daily  atorvastatin 20 milliGRAM(s) Oral at bedtime  heparin  Injectable 5000 Unit(s) SubCutaneous every 8 hours  sodium chloride 0.9%. 1000 milliLiter(s) IV Continuous <Continuous>  tamsulosin 0.4 milliGRAM(s) Oral at bedtime      Objective:  Vital Signs Last 24 Hrs  T(C): 36.2 (27 Oct 2019 07:00), Max: 36.6 (26 Oct 2019 23:25)  T(F): 97.2 (27 Oct 2019 07:00), Max: 97.9 (26 Oct 2019 23:25)  HR: 65 (27 Oct 2019 07:00) (63 - 65)  BP: 140/69 (27 Oct 2019 07:00) (140/69 - 154/64)  BP(mean): --  RR: 18 (27 Oct 2019 07:00) (18 - 18)  SpO2: 98% (27 Oct 2019 07:00) (98% - 100%)    PHYSICAL EXAM:  Constitutional:NAD  Eyes:ABDOUL, EOMI  Ear/Nose/Throat: no thrush, mucositis.  Moist mucous membranes	  Neck:no JVD, no lymphadenopathy, supple  Respiratory: CTA rory  Cardiovascular: S1S2 RRR, no murmurs  Gastrointestinal:soft, nontender,  nondistended (+) BS  Extremities:no e/e/c  Skin:  no rashes, open wounds or ulcerations        LABS:                        11.8   7.24  )-----------( 110      ( 27 Oct 2019 05:10 )             37.8     10-27    144  |  114<H>  |  10  ----------------------------<  75  3.6   |  21<L>  |  0.46<L>    Ca    7.6<L>      27 Oct 2019 05:10  Mg     1.8     10-27                              MICROBIOLOGY:          RADIOLOGY & ADDITIONAL STUDIES:
Patient is a 89y old  Male who presents with a chief complaint of possible syncope (25 Oct 2019 13:19)      INTERVAL HISTORY: feels ok       MEDICATIONS:  tamsulosin 0.4 milliGRAM(s) Oral at bedtime        PHYSICAL EXAM:  T(C): 37.1 (10-25-19 @ 14:13), Max: 37.1 (10-25-19 @ 14:13)  HR: 62 (10-25-19 @ 14:13) (55 - 76)  BP: 96/48 (10-25-19 @ 14:13) (96/48 - 126/72)  RR: 18 (10-25-19 @ 14:13) (17 - 18)  SpO2: 100% (10-25-19 @ 14:13) (97% - 100%)  Wt(kg): --  I&O's Summary    24 Oct 2019 07:01  -  25 Oct 2019 07:00  --------------------------------------------------------  IN: 1175 mL / OUT: 1175 mL / NET: 0 mL      Height (cm): 167.64 (10-24 @ 16:14)  Weight (kg): 49.9 (10-24 @ 16:14)  BMI (kg/m2): 17.8 (10-24 @ 16:14)  BSA (m2): 1.55 (10-24 @ 16:14)    Appearance: In no distress	  HEENT:    PERRL, EOMI	  Cardiovascular:  S1 S2, No JVD  Respiratory: Lungs clear to auscultation	  Gastrointestinal:  Soft, Non-tender, + BS	  Extremities:  No edema of LE                                12.8   6.96  )-----------( 128      ( 25 Oct 2019 05:26 )             40.3     10-25    141  |  105  |  12  ----------------------------<  81  4.2   |  25  |  0.69    Ca    9.3      25 Oct 2019 05:26    TPro  7.8  /  Alb  4.4  /  TBili  0.4  /  DBili  x   /  AST  23  /  ALT  10  /  AlkPhos  67  10-24        Labs personally reviewed      Assessment and Plan:   Assessment:  · Assessment		  89 y/o  male with PMHx of cataracts, HLD, past MI 24 y/o s/p atherectomy, CAD, varicose veins, h/o hernia x2, h/o fractured ankle 3 mo ago, and h/o BPH, p/w  syncopal episode, admitted to tele for syncope, r/o ACS, hypovolemia dehydration and acute cellulitis LLE.     Problem/Plan - 1:  ·  Problem: Syncope and collapse.  Plan: Telemetry monitor  Ep consulted for eval - Dr Parker  TTE pending     Problem/Plan - 2:  ·  Problem: Hypovolemia dehydration.  Plan: IV NS @ 75 cc/hour  encourage po intake.     Problem/Plan - 3:  ·  Problem: Urinary retention with incomplete bladder emptying.  Plan: Started pt on Flomax 0.4mg po daily  Repeat PVR Q8h x3.     Problem/Plan - 4:  ·  Problem: Cellulitis of left lower extremity without foot.  Plan: IV Cefazolin 1gram TID. ID consulted     Problem/Plan - 5:  ·  Problem: BPH with obstruction/lower urinary tract symptoms.  Plan: Started Flomax 0.4mg  F/u Urine Cx.     Problem/Plan - 6:  Problem: Hyperlipidemia, unspecified hyperlipidemia type. Plan: Repeat FLP.    Problem/Plan - 7:  ·  Problem: Prophylactic measure.  Plan: Heparin 5000 units SC Q8h.                Adam Purvis DO [facility]  Cardiovascular Medicine  [phone]
Patient is a 89y old  Male who presents with a chief complaint of possible syncope (26 Oct 2019 13:11)      INTERVAL HISTORY: feels ok    MEDICATIONS:  tamsulosin 0.4 milliGRAM(s) Oral at bedtime        PHYSICAL EXAM:  T(C): 36.4 (10-26-19 @ 12:12), Max: 36.7 (10-26-19 @ 05:09)  HR: 81 (10-26-19 @ 12:12) (59 - 81)  BP: 128/59 (10-26-19 @ 12:12) (116/53 - 128/59)  RR: 18 (10-26-19 @ 12:12) (18 - 18)  SpO2: 100% (10-26-19 @ 12:12) (100% - 100%)  Wt(kg): --  I&O's Summary    25 Oct 2019 07:01  -  26 Oct 2019 07:00  --------------------------------------------------------  IN: 1420 mL / OUT: 950 mL / NET: 470 mL    26 Oct 2019 07:01  -  27 Oct 2019 00:17  --------------------------------------------------------  IN: 1550 mL / OUT: 400 mL / NET: 1150 mL          Appearance: In no distress	  HEENT:    PERRL, EOMI	  Cardiovascular:  S1 S2, No JVD  Respiratory: Lungs clear to auscultation	  Gastrointestinal:  Soft, Non-tender, + BS	  Extremities:  No edema of LE                                12.8   7.72  )-----------( 118      ( 26 Oct 2019 05:02 )             40.8     10-25    141  |  105  |  12  ----------------------------<  81  4.2   |  25  |  0.69    Ca    9.3      25 Oct 2019 05:26          Labs personally reviewed      Assessment and Plan:   Assessment:  · Assessment		  89 y/o  male with PMHx of cataracts, HLD, past MI 24 y/o s/p atherectomy, CAD, varicose veins, h/o hernia x2, h/o fractured ankle 3 mo ago, and h/o BPH, p/w  syncopal episode, admitted to tele for syncope, r/o ACS, hypovolemia dehydration and acute cellulitis LLE.     Problem/Plan - 1:  ·  Problem: Syncope and collapse.  Plan: Telemetry monitor  EP consult appreciated  TTE with WMA and reduced EF  NM stress test Monday    Problem/Plan - 2:  ·  Problem: Hypovolemia dehydration.  Plan: Resolved  encourage po intake.     Problem/Plan - 3:  ·  Problem: Urinary retention with incomplete bladder emptying.  Plan: Started pt on Flomax 0.4mg po daily    Problem/Plan - 4:  ·  Problem: Cellulitis of left lower extremity without foot.  Plan: IV Cefazolin 1gram TID. ID consult appreciated     Problem/Plan - 5:  ·  Problem: BPH with obstruction/lower urinary tract symptoms.  Plan: Started Flomax 0.4mg    Problem/Plan - 6:  Problem: Hyperlipidemia, unspecified hyperlipidemia type. Plan:  Lipitor 20mg daily    Problem/Plan - 7:  ·  Problem: Prophylactic measure.  Plan: Heparin 5000 units SC Q8h.               Adam Purvis DO [facility]  Cardiovascular Medicine  [phone]
Patient is a 89y old  Male who presents with a chief complaint of possible syncope (27 Oct 2019 14:47)      INTERVAL HISTORY: feels ok    TELEMETRY: no events  	  MEDICATIONS:  tamsulosin 0.4 milliGRAM(s) Oral at bedtime        PHYSICAL EXAM:  T(C): 36.6 (10-27-19 @ 14:05), Max: 36.6 (10-26-19 @ 23:25)  HR: 68 (10-27-19 @ 14:05) (63 - 68)  BP: 128/52 (10-27-19 @ 14:05) (128/52 - 154/64)  RR: 19 (10-27-19 @ 14:05) (18 - 19)  SpO2: 99% (10-27-19 @ 14:05) (98% - 100%)  Wt(kg): --  I&O's Summary    26 Oct 2019 07:01  -  27 Oct 2019 07:00  --------------------------------------------------------  IN: 1550 mL / OUT: 400 mL / NET: 1150 mL    27 Oct 2019 07:01  -  27 Oct 2019 17:50  --------------------------------------------------------  IN: 1255 mL / OUT: 1300 mL / NET: -45 mL          Appearance: In no distress	  HEENT:    PERRL, EOMI	  Cardiovascular:  S1 S2, No JVD  Respiratory: Lungs clear to auscultation	  Gastrointestinal:  Soft, Non-tender, + BS	  Extremities:  No edema of LE                                11.8   7.24  )-----------( 110      ( 27 Oct 2019 05:10 )             37.8     10-27    144  |  114<H>  |  10  ----------------------------<  75  3.6   |  21<L>  |  0.46<L>    Ca    7.6<L>      27 Oct 2019 05:10  Mg     1.8     10-27          Labs personally reviewed      Assessment and Plan:   Assessment:  · Assessment		  89 y/o  male with PMHx of cataracts, HLD, past MI 24 y/o s/p atherectomy, CAD, varicose veins, h/o hernia x2, h/o fractured ankle 3 mo ago, and h/o BPH, p/w  syncopal episode, admitted to tele for syncope, r/o ACS, hypovolemia dehydration and acute cellulitis LLE.     Problem/Plan - 1:  ·  Problem: Syncope and collapse.  Plan: Telemetry monitor  EP follow up appreciated  TTE with WMA and reduced EF  NM stress test Monday    Problem/Plan - 2:  ·  Problem: Hypovolemia dehydration.  Plan: Resolved  encourage po intake.     Problem/Plan - 3:  ·  Problem: Urinary retention with incomplete bladder emptying.  Plan: Started pt on Flomax 0.4mg po daily    Problem/Plan - 4:  ·  Problem: Cellulitis of left lower extremity without foot.  Plan: IV Cefazolin 1gram TID. ID consult appreciated     Problem/Plan - 5:  ·  Problem: BPH with obstruction/lower urinary tract symptoms.  Plan: Started Flomax 0.4mg    Problem/Plan - 6:  Problem: Hyperlipidemia, unspecified hyperlipidemia type. Plan:  Lipitor 20mg daily    Problem/Plan - 7:  ·  Problem: Prophylactic measure.  Plan: Heparin 5000 units SC Q8h.               Adam Purvis DO [facility]  Cardiovascular Medicine  [phone]
Patient is a 89y old  Male who presents with a chief complaint of possible syncope (28 Oct 2019 16:46)      INTERVAL HISTORY:     TELEMETRY:  	  MEDICATIONS:  tamsulosin 0.4 milliGRAM(s) Oral at bedtime        PHYSICAL EXAM:  T(C): 36.6 (10-29-19 @ 00:35), Max: 36.7 (10-28-19 @ 21:44)  HR: 68 (10-29-19 @ 00:35) (63 - 93)  BP: 130/52 (10-29-19 @ 00:35) (103/56 - 147/74)  RR: 17 (10-29-19 @ 00:35) (17 - 18)  SpO2: 100% (10-29-19 @ 00:35) (98% - 100%)  Wt(kg): --  I&O's Summary    27 Oct 2019 07:01  -  28 Oct 2019 07:00  --------------------------------------------------------  IN: 2545 mL / OUT: 2500 mL / NET: 45 mL    28 Oct 2019 07:01  -  29 Oct 2019 00:41  --------------------------------------------------------  IN: 0 mL / OUT: 200 mL / NET: -200 mL          Appearance: In no distress	  HEENT:    PERRL, EOMI	  Cardiovascular:  S1 S2, No JVD  Respiratory: Lungs clear to auscultation	  Gastrointestinal:  Soft, Non-tender, + BS	  Extremities:  No edema of LE                                11.8   7.24  )-----------( 110      ( 27 Oct 2019 05:10 )             37.8     10-27    144  |  114<H>  |  10  ----------------------------<  75  3.6   |  21<L>  |  0.46<L>    Ca    7.6<L>      27 Oct 2019 05:10  Mg     1.8     10-27          Labs personally reviewed      ASSESSMENT/PLAN: 	          Adam Purvis DO [facility]  Cardiovascular Medicine  [phone]
S:  no palpitations, no syncope, no angina, ROS otherwise -        aspirin enteric coated 81 milliGRAM(s) Oral daily  atorvastatin 20 milliGRAM(s) Oral at bedtime  ceFAZolin   IVPB 1000 milliGRAM(s) IV Intermittent every 8 hours  heparin  Injectable 5000 Unit(s) SubCutaneous every 8 hours  influenza   Vaccine 0.5 milliLiter(s) IntraMuscular once  sodium chloride 0.9%. 1000 milliLiter(s) IV Continuous <Continuous>  tamsulosin 0.4 milliGRAM(s) Oral at bedtime    Hemoglobin: 11.8 g/dL (10-27 @ 05:10)  Hemoglobin: 12.8 g/dL (10-26 @ 05:02)  Hemoglobin: 12.8 g/dL (10-25 @ 05:26)      Creatinine Trend: 0.46<--, 0.69<--, 0.66<--, 0.62<--    COAGS:       PHYSICAL EXAM:  Vital Signs last 24 Hours   T(C): 36.6 (10-29-19 @ 04:49), Max: 36.7 (10-28-19 @ 21:44)  HR: 64 (10-29-19 @ 04:49) (64 - 93)  BP: 133/63 (10-29-19 @ 04:49) (103/56 - 140/72)  RR: 17 (10-29-19 @ 04:49) (17 - 17)  SpO2: 100% (10-29-19 @ 04:49) (98% - 100%)  Wt(kg): --    I&O's Summary    28 Oct 2019 07:01  -  29 Oct 2019 07:00  --------------------------------------------------------  IN: 0 mL / OUT: 200 mL / NET: -200 mL        no JVD  RRR, no murmurs  CTAB  soft nt/nd  no c/c/e  A&O x 3  neurologically intact    TELEMETRY:  SR, PACs    echo LVEF 45% with regional AW motion abnormality  NST pending      A/P) He is a pleasant 89 y/o male PMH hyperlipidemia, CAD s/p atherectomy (1990s), BPH admitted with possible syncope in setting of cellulitis. EP is called for periods of sinus bradycardia on tele - his baseline QRS is narrow, and there have been no malignant events on tele    -no anginal symptoms, syncope   -f/u NST per cardiology  -no obvious indication for PPM at this time  -recommend outpatient event monitoring  -given his advanced age an EP study screening for VT given his LVEF 45% would unlikely benefit him long term
[facility]    Subjective: Patient seen and examined. No new events except as noted.   episodes of matthias overnight   doing well     REVIEW OF SYSTEMS:    CONSTITUTIONAL: No weakness, fevers or chills  EYES/ENT: No visual changes;  No vertigo or throat pain   NECK: No pain or stiffness  RESPIRATORY: No cough, wheezing, hemoptysis; No shortness of breath  CARDIOVASCULAR: No chest pain or palpitations  GASTROINTESTINAL: No abdominal or epigastric pain. No nausea, vomiting, or hematemesis; No diarrhea or constipation. No melena or hematochezia.  GENITOURINARY: No dysuria, frequency or hematuria  NEUROLOGICAL: No numbness or weakness  SKIN: No itching, burning, rashes, or lesions   All other review of systems is negative unless indicated above.    MEDICATIONS:  MEDICATIONS  (STANDING):  aspirin enteric coated 81 milliGRAM(s) Oral daily  ceFAZolin   IVPB 1000 milliGRAM(s) IV Intermittent every 8 hours  heparin  Injectable 5000 Unit(s) SubCutaneous every 8 hours  influenza   Vaccine 0.5 milliLiter(s) IntraMuscular once  sodium chloride 0.9%. 1000 milliLiter(s) (75 mL/Hr) IV Continuous <Continuous>  tamsulosin 0.4 milliGRAM(s) Oral at bedtime      PHYSICAL EXAM:  T(C): 36.7 (10-26-19 @ 05:09), Max: 37.1 (10-25-19 @ 14:13)  HR: 59 (10-26-19 @ 05:09) (55 - 77)  BP: 116/53 (10-26-19 @ 05:09) (96/48 - 116/53)  RR: 18 (10-26-19 @ 05:09) (18 - 18)  SpO2: 100% (10-26-19 @ 05:09) (98% - 100%)  Wt(kg): --  I&O's Summary    25 Oct 2019 07:  -  26 Oct 2019 07:00  --------------------------------------------------------  IN: 1420 mL / OUT: 950 mL / NET: 470 mL    26 Oct 2019 07:  -  26 Oct 2019 10:52  --------------------------------------------------------  IN: 300 mL / OUT: 0 mL / NET: 300 mL          Appearance: Normal	  HEENT:   Normal oral mucosa, PERRL, EOMI	  Lymphatic: No lymphadenopathy , no edema  Cardiovascular: Normal S1 S2  Respiratory: Lungs clear to auscultation, normal effort 	  Gastrointestinal:  Soft, Non-tender, + BS	  Skin: No rashes, No ecchymoses, No cyanosis, warm to touch  Musculoskeletal: Normal range of motion, normal strength  Psychiatry:  Mood & affect appropriate  Ext: No edema      All labs, Imaging and EKGs personally reviewed                           12.8   7.72  )-----------( 118      ( 26 Oct 2019 05:02 )             40.8               10-25    141  |  105  |  12  ----------------------------<  81  4.2   |  25  |  0.69    Ca    9.3      25 Oct 2019 05:26    TPro  7.8  /  Alb  4.4  /  TBili  0.4  /  DBili  x   /  AST  23  /  ALT  10  /  AlkPhos  67  10-24           CARDIAC MARKERS ( 25 Oct 2019 05:26 )  x     / x     / 457 u/L / 8.47 ng/mL / x      CARDIAC MARKERS ( 24 Oct 2019 15:48 )  x     / x     / 314 u/L / 7.94 ng/mL / x      CARDIAC MARKERS ( 24 Oct 2019 12:15 )  x     / x     / 197 u/L / x     / x                  Urinalysis Basic - ( 24 Oct 2019 12:12 )    Color: LIGHT YELLOW / Appearance: CLEAR / S.013 / pH: 7.5  Gluc: NEGATIVE / Ketone: NEGATIVE  / Bili: NEGATIVE / Urobili: NORMAL   Blood: NEGATIVE / Protein: 20 / Nitrite: NEGATIVE   Leuk Esterase: NEGATIVE / RBC: 0-2 / WBC 0-2   Sq Epi: OCC / Non Sq Epi: x / Bacteria: NEGATIVE
[facility]    Subjective: Patient seen and examined. No new events except as noted.   doing well       REVIEW OF SYSTEMS:    CONSTITUTIONAL: No weakness, fevers or chills  EYES/ENT: No visual changes;  No vertigo or throat pain   NECK: No pain or stiffness  RESPIRATORY: No cough, wheezing, hemoptysis; No shortness of breath  CARDIOVASCULAR: No chest pain or palpitations  GASTROINTESTINAL: No abdominal or epigastric pain. No nausea, vomiting, or hematemesis; No diarrhea or constipation. No melena or hematochezia.  GENITOURINARY: No dysuria, frequency or hematuria  NEUROLOGICAL: No numbness or weakness  SKIN: No itching, burning, rashes, or lesions   All other review of systems is negative unless indicated above.    MEDICATIONS:  MEDICATIONS  (STANDING):  aspirin enteric coated 81 milliGRAM(s) Oral daily  atorvastatin 20 milliGRAM(s) Oral at bedtime  ceFAZolin   IVPB 1000 milliGRAM(s) IV Intermittent every 8 hours  heparin  Injectable 5000 Unit(s) SubCutaneous every 8 hours  influenza   Vaccine 0.5 milliLiter(s) IntraMuscular once  sodium chloride 0.9%. 1000 milliLiter(s) (75 mL/Hr) IV Continuous <Continuous>  tamsulosin 0.4 milliGRAM(s) Oral at bedtime      PHYSICAL EXAM:  T(C): 36.2 (10-27-19 @ 07:00), Max: 36.6 (10-26-19 @ 23:25)  HR: 65 (10-27-19 @ 07:00) (63 - 65)  BP: 140/69 (10-27-19 @ 07:00) (140/69 - 154/64)  RR: 18 (10-27-19 @ 07:00) (18 - 18)  SpO2: 98% (10-27-19 @ 07:00) (98% - 100%)  Wt(kg): --  I&O's Summary    26 Oct 2019 07:01  -  27 Oct 2019 07:00  --------------------------------------------------------  IN: 1550 mL / OUT: 400 mL / NET: 1150 mL    27 Oct 2019 07:01  -  27 Oct 2019 13:52  --------------------------------------------------------  IN: 955 mL / OUT: 1300 mL / NET: -345 mL          Appearance: Normal	  HEENT:   Normal oral mucosa, PERRL, EOMI	  Lymphatic: No lymphadenopathy , no edema  Cardiovascular: Normal S1 S2, No JVD  Respiratory: Lungs clear to auscultation, normal effort 	  Gastrointestinal:  Soft, Non-tender, + BS	  Skin: No rashes, No ecchymoses, No cyanosis, warm to touch  Musculoskeletal: Normal range of motion, normal strength  Psychiatry:  Mood & affect appropriate  Ext: No edema      All labs, Imaging and EKGs personally reviewed                           11.8   7.24  )-----------( 110      ( 27 Oct 2019 05:10 )             37.8               10-27    144  |  114<H>  |  10  ----------------------------<  75  3.6   |  21<L>  |  0.46<L>    Ca    7.6<L>      27 Oct 2019 05:10  Mg     1.8     10-27         < from: Transthoracic Echocardiogram (10.26.19 @ 09:51) >  CONCLUSIONS:  1. Mitral annular calcification, otherwise normal mitral  valve. Mild-moderate mitral regurgitation.  2. Calcified trileaflet aortic valve with decreased  opening. Peak transaortic valve gradient equals 21 mm Hg,  mean transaortic valve gradient equals 10 mm Hg, consistent  with mild aortic stenosis. Minimal aortic regurgitation.  3. Normal left ventricular internal dimensions and wall  thicknesses.  4. Moderate segmental left ventricular systolic  dysfunction. There is hypokinesis of the basal  inferoseptal, inferolateral and inferior walls.  5. Normal right ventricular size and function.

## 2019-10-29 NOTE — DISCHARGE NOTE NURSING/CASE MANAGEMENT/SOCIAL WORK - PATIENT PORTAL LINK FT
You can access the FollowMyHealth Patient Portal offered by [facility] by registering at the following website: [email or web address]. By joining [facility]’s FollowMyHealth portal, you will also be able to view your health information using other applications (apps) compatible with our system.

## 2019-10-29 NOTE — PROGRESS NOTE ADULT - NSHPATTENDINGPLANDISCUSS_GEN_ALL_CORE
Tele PA
House staff, card team

## 2019-10-29 NOTE — PROGRESS NOTE ADULT - ATTENDING COMMENTS
EP ATTENDING    Agree with above. No further inpatient EP workup needed. Recommend outpatient event monitoring given unclear history of syncope.
Advanced care planning was discussed with patient.  Advanced care planning forms were reviewed and discussed.
Advanced care planning was discussed with patient.  Advanced care planning forms were reviewed and discussed.
Advanced care planning was discussed with patient.  Advanced care planning forms were reviewed and discussed.    D/C planing
Advanced care planning was discussed with patient.  Advanced care planning forms were reviewed and discussed.
Advanced care planning was discussed with patient.  Advanced care planning forms were reviewed and discussed.  Differential diagnosis and plan of care discussed with patient after the evaluation.   Pain assessed and judicious use of narcotics when appropriate was discussed.  Importance of Fall prevention discussed.  Counseling on Smoking and Alcohol cessation was offered when appropriate.  Counseling on Diet, exercise, and medication compliance was done.

## 2019-10-29 NOTE — PROGRESS NOTE ADULT - PROBLEM SELECTOR PLAN 1
Telemetry monitor  Serial EKG's and CE's x3  Check Orthostatics  ECHO noted   COnt ASA   PT evaluation  SW  EP eval appreciated  plan for stress test tomorrow
Telemetry monitor  Serial EKG's and CE's x3  Check Orthostatics  ECHO noted   COnt ASA   PT evaluation  SW  EP eval appreciated  stress test noted  follow up outpatient with cardiology
Telemetry monitor  Serial EKG's and CE's x3  Check Orthostatics  ECHO pending   COnt ASA   PT evaluation  SW  EP eval appreciated
Telemetry monitor  Serial EKG's and CE's x3  Check Orthostatics  ECHO noted   COnt ASA   PT evaluation  SW  EP eval appreciated  plan for stress test today
Telemetry monitor  Serial EKG's and CE's x3  Check Orthostatics  ECHO pending   COnt ASA   PT evaluation  SW  EP eval appreciated  episodes of matthias overnight

## 2019-10-29 NOTE — PROVIDER CONTACT NOTE (OTHER) - ASSESSMENT
pt asymptomatic- no complaints of chest pain, palpitations, or dizziness pt asymptomatic- no complaints of chest pain, palpitations, or dizziness  BP: 130/52  HR:68

## 2019-10-29 NOTE — PROGRESS NOTE ADULT - PROBLEM SELECTOR PROBLEM 4
Cellulitis of left lower extremity without foot

## 2019-10-29 NOTE — PROGRESS NOTE ADULT - PROVIDER SPECIALTY LIST ADULT
Cardiology
Electrophysiology
Infectious Disease
Internal Medicine

## 2019-10-29 NOTE — PROGRESS NOTE ADULT - PROBLEM SELECTOR PLAN 5
Started Flomax 0.4mg  F/u Urine Cx

## 2019-10-29 NOTE — DISCHARGE NOTE PROVIDER - CARE PROVIDER_API CALL
Adam Purvis (DO)  Cardiovascular Disease; Internal Medicine; Nuclear Cardiology  [address] [place] [address], [place] [address]  Phone: [phone]  Fax: [phone]  Follow Up Time: Adam Purvis (DO)  Cardiovascular Disease; Internal Medicine; Nuclear Cardiology  [address] [facility], [address]  [place] [address]  Phone: [phone]  Fax: [phone]  Follow Up Time:     Isela Padilla)  Infectious Disease; Internal Medicine  [address] [place] [address]  Phone: [phone]  Fax: [phone]  Follow Up Time:

## 2019-10-29 NOTE — PROGRESS NOTE ADULT - ASSESSMENT
89 y/o  male with PMHx of cataracts, HLD, past MI 24 y/o s/p atherectomy, CAD, varicose veins, h/o hernia x2, h/o fractured ankle 3 mo ago, and h/o BPH, p/w  syncopal episode, admitted to tele for syncope, r/o ACS, hypovolemia dehydration and acute cellulitis LLE.
89 y/o  male with PMHx of cataracts, HLD, past MI 24 y/o s/p atherectomy, CAD, varicose veins, h/o hernia x2, h/o fractured ankle 3 mo ago, and h/o BPH, p/w  syncopal episode, admitted to tele for syncope, r/o ACS, hypovolemia dehydration and acute cellulitis LLE.
89 y/o  male with PMHx of cataracts, HLD, past MI 24 y/o s/p atherectomy, CAD, varicose veins, h/o hernia x2, h/o fractured ankle 3 mo ago, and h/o BPH, p/w possible syncopal episode. Pt was found lying supine on the train, confused, agitated and (+) urinary incontinence on scene. Pt reports he woke up this morning, went to the bathroom, then went to the train station to go to his dentist appt at 9am on [facility] for an implantation consultation. Pt reveals he remembers getting onto the train and waking up in the ambulance en route to hospital, but cannot recall what happened in between.     Pt is currently A&Ox3. Pt c/o bladder fullness, and poor urinary stream x4-5mo. Pt states he is unable to fully empty his bladder, and feels he is always going to the bathroom. Pt states he had a colonoscopy 5 years ago at [facility], which was normal. Pt is unaware when his last prostate exam was. Pt endorses feeling fine this morning. Pt denies lightheadedness, dizziness, HA, CP, SOB, and h/o syncope or seizures this morning. Pt states he has not been drinking any water since arrival at the ED, as he does not want to urinate frequently. Pt also c/o LE swelling about 3-4 mo ago, which spontaneously resolved, and chronic insomnia. Denies abdominal pain, N/V/C/D, vision changes, dysuria, hematuria, night sweats, weight changes, joint stiffness, and muscle weakness at this time. Denies recent travel, sick contacts and prolonged bed rest at this time.    On admission pt's PVR with a bladder scan is 200cc. (24 Oct 2019 15:14)    Pt a/w syncopal episode, on tele monitoring for syncope, r/o ACS, hypovolemia dehydration and acute cellulitis LLE.  Pt is currently on cefazolin.  ID consult called for further recommendations.     ER vss, pt afebrile.  No leukocytosis.       LLE cellulitis:    - Pt with increased erythema over LLE.  No open wounds, fluctuance noted.  No TTP.  Cont cefazolin, monitor for clinical improvement.    - If pt spikes fever >100.4, send bcx    * Pt a/w syncope, on telemetry monitoring, r/o ACS.  Serial EKGs and cardiac enzymes, check orthostatics.  Pending echo    Today, 10/27/19:    -  Patient stable, no acute distress noted, no fever, WBC within normal range.    -  Left leg cellulitis much improved, continue on ancef
89 y/o  male with PMHx of cataracts, HLD, past MI 24 y/o s/p atherectomy, CAD, varicose veins, h/o hernia x2, h/o fractured ankle 3 mo ago, and h/o BPH, p/w possible syncopal episode. Pt was found lying supine on the train, confused, agitated and (+) urinary incontinence on scene. Pt reports he woke up this morning, went to the bathroom, then went to the train station to go to his dentist appt at 9am on [facility] for an implantation consultation. Pt reveals he remembers getting onto the train and waking up in the ambulance en route to hospital, but cannot recall what happened in between.     Pt is currently A&Ox3. Pt c/o bladder fullness, and poor urinary stream x4-5mo. Pt states he is unable to fully empty his bladder, and feels he is always going to the bathroom. Pt states he had a colonoscopy 5 years ago at [facility], which was normal. Pt is unaware when his last prostate exam was. Pt endorses feeling fine this morning. Pt denies lightheadedness, dizziness, HA, CP, SOB, and h/o syncope or seizures this morning. Pt states he has not been drinking any water since arrival at the ED, as he does not want to urinate frequently. Pt also c/o LE swelling about 3-4 mo ago, which spontaneously resolved, and chronic insomnia. Denies abdominal pain, N/V/C/D, vision changes, dysuria, hematuria, night sweats, weight changes, joint stiffness, and muscle weakness at this time. Denies recent travel, sick contacts and prolonged bed rest at this time.    On admission pt's PVR with a bladder scan is 200cc. (24 Oct 2019 15:14)    Pt a/w syncopal episode, on tele monitoring for syncope, r/o ACS, hypovolemia dehydration and acute cellulitis LLE.  Pt is currently on cefazolin.  ID consult called for further recommendations.     ER vss, pt afebrile.  No leukocytosis.       LLE cellulitis:    - Pt with increased erythema over LLE.  No open wounds, fluctuance noted.  No TTP.  Cont cefazolin, monitor for clinical improvement.    - If pt spikes fever >100.4, send bcx    * Pt a/w syncope, on telemetry monitoring, r/o ACS.  Serial EKGs and cardiac enzymes, check orthostatics.  Pending echo     Today, 10/26/19:    -  Stable, no acute distress noted, left leg cellulitis improving, mild redness, no swelling, continue Ancef to completion, BUN/Creatine normal    -  S/P Echo, awaiting official results    -  Will continue to observe
89 y/o  male with PMHx of cataracts, HLD, past MI 26 y/o s/p atherectomy, CAD, varicose veins, h/o hernia x2, h/o fractured ankle 3 mo ago, and h/o BPH, p/w  syncopal episode, admitted to tele for syncope, r/o ACS, hypovolemia dehydration and acute cellulitis LLE.
89 y/o  male with PMHx of cataracts, HLD, past MI 26 y/o s/p atherectomy, CAD, varicose veins, h/o hernia x2, h/o fractured ankle 3 mo ago, and h/o BPH, p/w possible syncopal episode. Pt was found lying supine on the train, confused, agitated and (+) urinary incontinence on scene. Pt reports he woke up this morning, went to the bathroom, then went to the train station to go to his dentist appt at 9am on [facility] for an implantation consultation. Pt reveals he remembers getting onto the train and waking up in the ambulance en route to hospital, but cannot recall what happened in between.     Pt is currently A&Ox3. Pt c/o bladder fullness, and poor urinary stream x4-5mo. Pt states he is unable to fully empty his bladder, and feels he is always going to the bathroom. Pt states he had a colonoscopy 5 years ago at [facility], which was normal. Pt is unaware when his last prostate exam was. Pt endorses feeling fine this morning. Pt denies lightheadedness, dizziness, HA, CP, SOB, and h/o syncope or seizures this morning. Pt states he has not been drinking any water since arrival at the ED, as he does not want to urinate frequently. Pt also c/o LE swelling about 3-4 mo ago, which spontaneously resolved, and chronic insomnia. Denies abdominal pain, N/V/C/D, vision changes, dysuria, hematuria, night sweats, weight changes, joint stiffness, and muscle weakness at this time. Denies recent travel, sick contacts and prolonged bed rest at this time.    On admission pt's PVR with a bladder scan is 200cc. (24 Oct 2019 15:14)    Pt a/w syncopal episode, on tele monitoring for syncope, r/o ACS, hypovolemia dehydration and acute cellulitis LLE.  Pt is currently on cefazolin.  ID consult called for further recommendations.     ER vss, pt afebrile.  No leukocytosis.       LLE cellulitis:    - Pt with increased erythema over LLE.  No open wounds, fluctuance noted.  No TTP.  Erythema resolving on cefazolin.     - If pt spikes fever >100.4, send bcx    * Pt a/w syncope, on telemetry monitoring, r/o ACS.  Serial EKGs and cardiac enzymes, check orthostatics.  Pending echo      Pt remains  stable, no acute distress noted, no fever, WBC within normal range.   Left leg cellulitis much improved, continue on ancef, complete total 7 day course through 10/31.  Can change to PO keflex prior to discharge.       will follow,    Isela Padilla  [phone]
89 y/o  male with PMHx of cataracts, HLD, past MI 26 y/o s/p atherectomy, CAD, varicose veins, h/o hernia x2, h/o fractured ankle 3 mo ago, and h/o BPH, p/w possible syncopal episode. Pt was found lying supine on the train, confused, agitated and (+) urinary incontinence on scene. Pt reports he woke up this morning, went to the bathroom, then went to the train station to go to his dentist appt at 9am on [facility] for an implantation consultation. Pt reveals he remembers getting onto the train and waking up in the ambulance en route to hospital, but cannot recall what happened in between.     Pt is currently A&Ox3. Pt c/o bladder fullness, and poor urinary stream x4-5mo. Pt states he is unable to fully empty his bladder, and feels he is always going to the bathroom. Pt states he had a colonoscopy 5 years ago at [facility], which was normal. Pt is unaware when his last prostate exam was. Pt endorses feeling fine this morning. Pt denies lightheadedness, dizziness, HA, CP, SOB, and h/o syncope or seizures this morning. Pt states he has not been drinking any water since arrival at the ED, as he does not want to urinate frequently. Pt also c/o LE swelling about 3-4 mo ago, which spontaneously resolved, and chronic insomnia. Denies abdominal pain, N/V/C/D, vision changes, dysuria, hematuria, night sweats, weight changes, joint stiffness, and muscle weakness at this time. Denies recent travel, sick contacts and prolonged bed rest at this time.    On admission pt's PVR with a bladder scan is 200cc. (24 Oct 2019 15:14)    Pt a/w syncopal episode, on tele monitoring for syncope, r/o ACS, hypovolemia dehydration and acute cellulitis LLE.  Pt is currently on cefazolin.  ID consult called for further recommendations.     ER vss, pt afebrile.  No leukocytosis.       LLE cellulitis:    - Pt with increased erythema over LLE.  No open wounds, fluctuance noted.  No TTP.  Erythema resolving on cefazolin.       * Pt a/w syncope, on telemetry monitoring, r/o ACS.  Serial EKGs and cardiac enzymes, check orthostatics, echo - Cardiology following      Pt remains  stable, no acute distress noted, no fever, WBC within normal range.   Left leg cellulitis much improved, continue on ancef, complete total 7 day course through 10/31.  Can change to PO keflex 500mg PO q6 prior to discharge.           Isela Padilla  [phone]
89 y/o  male with PMHx of cataracts, HLD, past MI 24 y/o s/p atherectomy, CAD, varicose veins, h/o hernia x2, h/o fractured ankle 3 mo ago, and h/o BPH, p/w  syncopal episode, admitted to tele for syncope, r/o ACS, hypovolemia dehydration and acute cellulitis LLE.
89 y/o  male with PMHx of cataracts, HLD, past MI 26 y/o s/p atherectomy, CAD, varicose veins, h/o hernia x2, h/o fractured ankle 3 mo ago, and h/o BPH, p/w  syncopal episode, admitted to tele for syncope, r/o ACS, hypovolemia dehydration and acute cellulitis LLE.

## 2019-10-29 NOTE — PROGRESS NOTE ADULT - PROBLEM SELECTOR PLAN 3
Started pt on Flomax 0.4mg po daily  Repeat PVR Q8h x3

## 2019-10-29 NOTE — DISCHARGE NOTE PROVIDER - HOSPITAL COURSE
89 YO Male who presents with a chief complaint of possible syncope 89 YO Male PMHx of cataracts, HLD, past MI 24 y/o s/p atherectomy, CAD, varicose veins, h/o hernia x2, h/o fractured ankle 3 mo ago, and h/o BPH, p/w possible syncopal episode. Pt was found lying supine on the train, confused, agitated and (+) urinary incontinence on scene. Pt reports he woke up this morning, went to the bathroom, then went to the train station to go to his dentist appt at 9am on [facility] for an implantation consultation. Pt reveals he remembers getting onto the train and waking up in the ambulance en route to hospital, but cannot recall what happened in between. Patient admitted with possible syncope in setting of cellulitis.         # Syncope:     - No anginal symptoms     - No obvious indication for PPM at this time    - Recommend outpatient event monitoring    - Given his advanced age an EP study screening for VT given his LVEF 45% would unlikely benefit him long term    - Follow up with Dr. Purvis within 1 week of discharge         # LLE Cellulitis:     -Continue Keflex 500q6 until 10/31     -Follow up with Dr. Padilla outpatient within 1 week of discharge             10/29: Patient stable for discharge and medications reviewed as per Dr. Purvis

## 2019-10-29 NOTE — DISCHARGE NOTE PROVIDER - NSDCMRMEDTOKEN_GEN_ALL_CORE_FT
aspirin 81 mg oral delayed release tablet: 1 tab(s) orally once a day  atorvastatin 20 mg oral tablet: 1 tab(s) orally once a day (at bedtime)  Keflex 500 mg oral capsule: 1 cap(s) orally every 6 hours   tamsulosin 0.4 mg oral capsule: 1 cap(s) orally once a day (at bedtime)
